# Patient Record
Sex: MALE | Race: WHITE | NOT HISPANIC OR LATINO | Employment: OTHER | URBAN - NONMETROPOLITAN AREA
[De-identification: names, ages, dates, MRNs, and addresses within clinical notes are randomized per-mention and may not be internally consistent; named-entity substitution may affect disease eponyms.]

---

## 2019-02-02 ENCOUNTER — HOSPITAL ENCOUNTER (INPATIENT)
Facility: HOSPITAL | Age: 79
LOS: 1 days | Discharge: HOME | End: 2019-02-04
Attending: EMERGENCY MEDICINE | Admitting: INTERNAL MEDICINE

## 2019-02-02 ENCOUNTER — APPOINTMENT (OUTPATIENT)
Dept: GENERAL RADIOLOGY | Facility: HOSPITAL | Age: 79
End: 2019-02-02

## 2019-02-02 DIAGNOSIS — I48.91 NEW ONSET ATRIAL FIBRILLATION (HCC): Primary | ICD-10-CM

## 2019-02-02 LAB
ALBUMIN SERPL-MCNC: 4.3 G/DL (ref 3.4–4.8)
ALBUMIN/GLOB SERPL: 1.7 G/DL (ref 1.5–2.5)
ALP SERPL-CCNC: 111 U/L (ref 40–129)
ALT SERPL W P-5'-P-CCNC: 26 U/L (ref 10–44)
ANION GAP SERPL CALCULATED.3IONS-SCNC: 6.5 MMOL/L (ref 3.6–11.2)
APTT PPP: 36.2 SECONDS (ref 23.8–36.1)
AST SERPL-CCNC: 23 U/L (ref 10–34)
BASOPHILS # BLD AUTO: 0.02 10*3/MM3 (ref 0–0.3)
BASOPHILS NFR BLD AUTO: 0.2 % (ref 0–2)
BILIRUB SERPL-MCNC: 1.1 MG/DL (ref 0.2–1.8)
BILIRUB UR QL STRIP: NEGATIVE
BUN BLD-MCNC: 21 MG/DL (ref 7–21)
BUN/CREAT SERPL: 18.3 (ref 7–25)
CALCIUM SPEC-SCNC: 9.1 MG/DL (ref 7.7–10)
CHLORIDE SERPL-SCNC: 108 MMOL/L (ref 99–112)
CLARITY UR: ABNORMAL
CO2 SERPL-SCNC: 25.5 MMOL/L (ref 24.3–31.9)
COLOR UR: ABNORMAL
CREAT BLD-MCNC: 1.15 MG/DL (ref 0.43–1.29)
D DIMER PPP FEU-MCNC: 0.38 MCGFEU/ML (ref 0–0.5)
DEPRECATED RDW RBC AUTO: 45 FL (ref 37–54)
EOSINOPHIL # BLD AUTO: 0.12 10*3/MM3 (ref 0–0.7)
EOSINOPHIL NFR BLD AUTO: 1.2 % (ref 0–7)
ERYTHROCYTE [DISTWIDTH] IN BLOOD BY AUTOMATED COUNT: 13.7 % (ref 11.5–14.5)
GFR SERPL CREATININE-BSD FRML MDRD: 62 ML/MIN/1.73
GLOBULIN UR ELPH-MCNC: 2.5 GM/DL
GLUCOSE BLD-MCNC: 124 MG/DL (ref 70–110)
GLUCOSE UR STRIP-MCNC: NEGATIVE MG/DL
HCT VFR BLD AUTO: 43.2 % (ref 42–52)
HGB BLD-MCNC: 14.6 G/DL (ref 14–18)
HGB UR QL STRIP.AUTO: NEGATIVE
HOLD SPECIMEN: NORMAL
HOLD SPECIMEN: NORMAL
IMM GRANULOCYTES # BLD AUTO: 0.02 10*3/MM3 (ref 0–0.03)
IMM GRANULOCYTES NFR BLD AUTO: 0.2 % (ref 0–0.5)
INR PPP: 1.05 (ref 0.9–1.1)
KETONES UR QL STRIP: ABNORMAL
LEUKOCYTE ESTERASE UR QL STRIP.AUTO: NEGATIVE
LYMPHOCYTES # BLD AUTO: 2.37 10*3/MM3 (ref 1–3)
LYMPHOCYTES NFR BLD AUTO: 23.7 % (ref 16–46)
MAGNESIUM SERPL-MCNC: 2 MG/DL (ref 1.7–2.6)
MCH RBC QN AUTO: 30.8 PG (ref 27–33)
MCHC RBC AUTO-ENTMCNC: 33.8 G/DL (ref 33–37)
MCV RBC AUTO: 91.1 FL (ref 80–94)
MONOCYTES # BLD AUTO: 1.48 10*3/MM3 (ref 0.1–0.9)
MONOCYTES NFR BLD AUTO: 14.8 % (ref 0–12)
NEUTROPHILS # BLD AUTO: 5.97 10*3/MM3 (ref 1.4–6.5)
NEUTROPHILS NFR BLD AUTO: 59.9 % (ref 40–75)
NITRITE UR QL STRIP: NEGATIVE
OSMOLALITY SERPL CALC.SUM OF ELEC: 283.8 MOSM/KG (ref 273–305)
PH UR STRIP.AUTO: <=5 [PH] (ref 5–8)
PLATELET # BLD AUTO: 207 10*3/MM3 (ref 130–400)
PMV BLD AUTO: 11.1 FL (ref 6–10)
POTASSIUM BLD-SCNC: 4 MMOL/L (ref 3.5–5.3)
PROT SERPL-MCNC: 6.8 G/DL (ref 6–8)
PROT UR QL STRIP: NEGATIVE
PROTHROMBIN TIME: 14 SECONDS (ref 11–15.4)
RBC # BLD AUTO: 4.74 10*6/MM3 (ref 4.7–6.1)
SODIUM BLD-SCNC: 140 MMOL/L (ref 135–153)
SP GR UR STRIP: 1.03 (ref 1–1.03)
TROPONIN I SERPL-MCNC: <0.006 NG/ML
UROBILINOGEN UR QL STRIP: ABNORMAL
WBC NRBC COR # BLD: 9.98 10*3/MM3 (ref 4.5–12.5)
WHOLE BLOOD HOLD SPECIMEN: NORMAL
WHOLE BLOOD HOLD SPECIMEN: NORMAL

## 2019-02-02 PROCEDURE — 80053 COMPREHEN METABOLIC PANEL: CPT | Performed by: EMERGENCY MEDICINE

## 2019-02-02 PROCEDURE — 84484 ASSAY OF TROPONIN QUANT: CPT | Performed by: EMERGENCY MEDICINE

## 2019-02-02 PROCEDURE — 85025 COMPLETE CBC W/AUTO DIFF WBC: CPT | Performed by: EMERGENCY MEDICINE

## 2019-02-02 PROCEDURE — 83735 ASSAY OF MAGNESIUM: CPT | Performed by: EMERGENCY MEDICINE

## 2019-02-02 PROCEDURE — 85730 THROMBOPLASTIN TIME PARTIAL: CPT | Performed by: EMERGENCY MEDICINE

## 2019-02-02 PROCEDURE — 85379 FIBRIN DEGRADATION QUANT: CPT | Performed by: EMERGENCY MEDICINE

## 2019-02-02 PROCEDURE — 71046 X-RAY EXAM CHEST 2 VIEWS: CPT

## 2019-02-02 PROCEDURE — 71046 X-RAY EXAM CHEST 2 VIEWS: CPT | Performed by: RADIOLOGY

## 2019-02-02 PROCEDURE — 99285 EMERGENCY DEPT VISIT HI MDM: CPT

## 2019-02-02 PROCEDURE — 93005 ELECTROCARDIOGRAM TRACING: CPT | Performed by: EMERGENCY MEDICINE

## 2019-02-02 PROCEDURE — 81003 URINALYSIS AUTO W/O SCOPE: CPT | Performed by: EMERGENCY MEDICINE

## 2019-02-02 PROCEDURE — 85610 PROTHROMBIN TIME: CPT | Performed by: EMERGENCY MEDICINE

## 2019-02-02 PROCEDURE — 93010 ELECTROCARDIOGRAM REPORT: CPT | Performed by: INTERNAL MEDICINE

## 2019-02-02 RX ORDER — ASPIRIN 81 MG/1
324 TABLET, CHEWABLE ORAL ONCE
Status: COMPLETED | OUTPATIENT
Start: 2019-02-02 | End: 2019-02-02

## 2019-02-02 RX ADMIN — ASPIRIN 324 MG: 81 TABLET, CHEWABLE ORAL at 22:34

## 2019-02-03 ENCOUNTER — APPOINTMENT (OUTPATIENT)
Dept: CARDIOLOGY | Facility: HOSPITAL | Age: 79
End: 2019-02-03
Attending: INTERNAL MEDICINE

## 2019-02-03 PROBLEM — I48.91 NEW ONSET ATRIAL FIBRILLATION: Status: ACTIVE | Noted: 2019-02-03

## 2019-02-03 LAB
ALBUMIN SERPL-MCNC: 4.1 G/DL (ref 3.4–4.8)
ALBUMIN/GLOB SERPL: 1.8 G/DL (ref 1.5–2.5)
ALP SERPL-CCNC: 100 U/L (ref 40–129)
ALT SERPL W P-5'-P-CCNC: 16 U/L (ref 10–44)
ANION GAP SERPL CALCULATED.3IONS-SCNC: 5.9 MMOL/L (ref 3.6–11.2)
AST SERPL-CCNC: 15 U/L (ref 10–34)
BASOPHILS # BLD AUTO: 0.01 10*3/MM3 (ref 0–0.3)
BASOPHILS NFR BLD AUTO: 0.1 % (ref 0–2)
BH CV ECHO MEAS - % IVS THICK: -13.4 %
BH CV ECHO MEAS - % LVPW THICK: 22.3 %
BH CV ECHO MEAS - ACS: 2.1 CM
BH CV ECHO MEAS - AO MAX PG: 10.4 MMHG
BH CV ECHO MEAS - AO MEAN PG: 5 MMHG
BH CV ECHO MEAS - AO ROOT AREA (BSA CORRECTED): 1.5
BH CV ECHO MEAS - AO ROOT AREA: 7.9 CM^2
BH CV ECHO MEAS - AO ROOT DIAM: 3.2 CM
BH CV ECHO MEAS - AO V2 MAX: 161.5 CM/SEC
BH CV ECHO MEAS - AO V2 MEAN: 102.2 CM/SEC
BH CV ECHO MEAS - AO V2 VTI: 33.5 CM
BH CV ECHO MEAS - BSA(HAYCOCK): 2.2 M^2
BH CV ECHO MEAS - BSA: 2.2 M^2
BH CV ECHO MEAS - BZI_BMI: 29.6 KILOGRAMS/M^2
BH CV ECHO MEAS - BZI_METRIC_HEIGHT: 180.3 CM
BH CV ECHO MEAS - BZI_METRIC_WEIGHT: 96.2 KG
BH CV ECHO MEAS - EDV(CUBED): 108.6 ML
BH CV ECHO MEAS - EDV(MOD-SP4): 63 ML
BH CV ECHO MEAS - EDV(TEICH): 106 ML
BH CV ECHO MEAS - EF(CUBED): 61.1 %
BH CV ECHO MEAS - EF(MOD-SP4): 42.9 %
BH CV ECHO MEAS - EF(TEICH): 52.6 %
BH CV ECHO MEAS - ESV(CUBED): 42.2 ML
BH CV ECHO MEAS - ESV(MOD-SP4): 36 ML
BH CV ECHO MEAS - ESV(TEICH): 50.3 ML
BH CV ECHO MEAS - FS: 27 %
BH CV ECHO MEAS - IVS/LVPW: 1.2
BH CV ECHO MEAS - IVSD: 1.2 CM
BH CV ECHO MEAS - IVSS: 1 CM
BH CV ECHO MEAS - LA DIMENSION: 3.7 CM
BH CV ECHO MEAS - LA/AO: 1.2
BH CV ECHO MEAS - LV DIASTOLIC VOL/BSA (35-75): 29.1 ML/M^2
BH CV ECHO MEAS - LV MASS(C)D: 189.1 GRAMS
BH CV ECHO MEAS - LV MASS(C)DI: 87.5 GRAMS/M^2
BH CV ECHO MEAS - LV MASS(C)S: 121.1 GRAMS
BH CV ECHO MEAS - LV MASS(C)SI: 56 GRAMS/M^2
BH CV ECHO MEAS - LV SYSTOLIC VOL/BSA (12-30): 16.7 ML/M^2
BH CV ECHO MEAS - LVIDD: 4.8 CM
BH CV ECHO MEAS - LVIDS: 3.5 CM
BH CV ECHO MEAS - LVLD AP4: 6 CM
BH CV ECHO MEAS - LVLS AP4: 5.6 CM
BH CV ECHO MEAS - LVOT AREA (M): 4.2 CM^2
BH CV ECHO MEAS - LVOT AREA: 4.1 CM^2
BH CV ECHO MEAS - LVOT DIAM: 2.3 CM
BH CV ECHO MEAS - LVPWD: 0.99 CM
BH CV ECHO MEAS - LVPWS: 1.2 CM
BH CV ECHO MEAS - MV A MAX VEL: 30.1 CM/SEC
BH CV ECHO MEAS - MV E MAX VEL: 120.9 CM/SEC
BH CV ECHO MEAS - MV E/A: 4
BH CV ECHO MEAS - PA ACC SLOPE: 626.4 CM/SEC^2
BH CV ECHO MEAS - PA ACC TIME: 0.14 SEC
BH CV ECHO MEAS - PA PR(ACCEL): 15.6 MMHG
BH CV ECHO MEAS - RAP SYSTOLE: 10 MMHG
BH CV ECHO MEAS - RVDD: 1.5 CM
BH CV ECHO MEAS - RVSP: 40.1 MMHG
BH CV ECHO MEAS - SI(AO): 122.4 ML/M^2
BH CV ECHO MEAS - SI(CUBED): 30.7 ML/M^2
BH CV ECHO MEAS - SI(MOD-SP4): 12.5 ML/M^2
BH CV ECHO MEAS - SI(TEICH): 25.8 ML/M^2
BH CV ECHO MEAS - SV(AO): 264.5 ML
BH CV ECHO MEAS - SV(CUBED): 66.4 ML
BH CV ECHO MEAS - SV(MOD-SP4): 27 ML
BH CV ECHO MEAS - SV(TEICH): 55.8 ML
BH CV ECHO MEAS - TR MAX VEL: 274.2 CM/SEC
BILIRUB SERPL-MCNC: 0.9 MG/DL (ref 0.2–1.8)
BUN BLD-MCNC: 21 MG/DL (ref 7–21)
BUN/CREAT SERPL: 19.6 (ref 7–25)
CALCIUM SPEC-SCNC: 8.9 MG/DL (ref 7.7–10)
CHLORIDE SERPL-SCNC: 110 MMOL/L (ref 99–112)
CHOLEST SERPL-MCNC: 139 MG/DL (ref 0–200)
CK MB SERPL-CCNC: 0.73 NG/ML (ref 0–5)
CK MB SERPL-CCNC: 0.77 NG/ML (ref 0–5)
CK MB SERPL-CCNC: 0.79 NG/ML (ref 0–5)
CK MB SERPL-RTO: 3.4 % (ref 0–3)
CK MB SERPL-RTO: 4.6 % (ref 0–3)
CK MB SERPL-RTO: 4.8 % (ref 0–3)
CK SERPL-CCNC: 16 U/L (ref 24–204)
CK SERPL-CCNC: 16 U/L (ref 24–204)
CK SERPL-CCNC: 23 U/L (ref 24–204)
CO2 SERPL-SCNC: 24.1 MMOL/L (ref 24.3–31.9)
CREAT BLD-MCNC: 1.07 MG/DL (ref 0.43–1.29)
DEPRECATED RDW RBC AUTO: 44.2 FL (ref 37–54)
EOSINOPHIL # BLD AUTO: 0.17 10*3/MM3 (ref 0–0.7)
EOSINOPHIL NFR BLD AUTO: 1.8 % (ref 0–7)
ERYTHROCYTE [DISTWIDTH] IN BLOOD BY AUTOMATED COUNT: 13.4 % (ref 11.5–14.5)
GFR SERPL CREATININE-BSD FRML MDRD: 67 ML/MIN/1.73
GLOBULIN UR ELPH-MCNC: 2.3 GM/DL
GLUCOSE BLD-MCNC: 126 MG/DL (ref 70–110)
HCT VFR BLD AUTO: 41.6 % (ref 42–52)
HDLC SERPL-MCNC: 31 MG/DL (ref 60–100)
HGB BLD-MCNC: 14 G/DL (ref 14–18)
IMM GRANULOCYTES # BLD AUTO: 0.03 10*3/MM3 (ref 0–0.03)
IMM GRANULOCYTES NFR BLD AUTO: 0.3 % (ref 0–0.5)
LDLC SERPL CALC-MCNC: 91 MG/DL (ref 0–100)
LDLC/HDLC SERPL: 2.94 {RATIO}
LYMPHOCYTES # BLD AUTO: 2.11 10*3/MM3 (ref 1–3)
LYMPHOCYTES NFR BLD AUTO: 22.8 % (ref 16–46)
MAXIMAL PREDICTED HEART RATE: 143 BPM
MCH RBC QN AUTO: 30.7 PG (ref 27–33)
MCHC RBC AUTO-ENTMCNC: 33.7 G/DL (ref 33–37)
MCV RBC AUTO: 91.2 FL (ref 80–94)
MONOCYTES # BLD AUTO: 1.31 10*3/MM3 (ref 0.1–0.9)
MONOCYTES NFR BLD AUTO: 14.2 % (ref 0–12)
MYOGLOBIN SERPL-MCNC: 43 NG/ML (ref 0–109)
MYOGLOBIN SERPL-MCNC: 43 NG/ML (ref 0–109)
MYOGLOBIN SERPL-MCNC: 44 NG/ML (ref 0–109)
NEUTROPHILS # BLD AUTO: 5.61 10*3/MM3 (ref 1.4–6.5)
NEUTROPHILS NFR BLD AUTO: 60.8 % (ref 40–75)
OSMOLALITY SERPL CALC.SUM OF ELEC: 283.9 MOSM/KG (ref 273–305)
PLATELET # BLD AUTO: 175 10*3/MM3 (ref 130–400)
PMV BLD AUTO: 11 FL (ref 6–10)
POTASSIUM BLD-SCNC: 3.7 MMOL/L (ref 3.5–5.3)
PROT SERPL-MCNC: 6.4 G/DL (ref 6–8)
RBC # BLD AUTO: 4.56 10*6/MM3 (ref 4.7–6.1)
SODIUM BLD-SCNC: 140 MMOL/L (ref 135–153)
STRESS TARGET HR: 122 BPM
TRIGL SERPL-MCNC: 85 MG/DL (ref 0–150)
TROPONIN I SERPL-MCNC: <0.006 NG/ML
TSH SERPL DL<=0.05 MIU/L-ACNC: 4.79 MIU/ML (ref 0.55–4.78)
VLDLC SERPL-MCNC: 17 MG/DL
WBC NRBC COR # BLD: 9.24 10*3/MM3 (ref 4.5–12.5)

## 2019-02-03 PROCEDURE — 84484 ASSAY OF TROPONIN QUANT: CPT | Performed by: INTERNAL MEDICINE

## 2019-02-03 PROCEDURE — 94799 UNLISTED PULMONARY SVC/PX: CPT

## 2019-02-03 PROCEDURE — 84484 ASSAY OF TROPONIN QUANT: CPT | Performed by: EMERGENCY MEDICINE

## 2019-02-03 PROCEDURE — 83874 ASSAY OF MYOGLOBIN: CPT | Performed by: INTERNAL MEDICINE

## 2019-02-03 PROCEDURE — 80061 LIPID PANEL: CPT | Performed by: INTERNAL MEDICINE

## 2019-02-03 PROCEDURE — 99222 1ST HOSP IP/OBS MODERATE 55: CPT | Performed by: INTERNAL MEDICINE

## 2019-02-03 PROCEDURE — 99220 PR INITIAL OBSERVATION CARE/DAY 70 MINUTES: CPT | Performed by: INTERNAL MEDICINE

## 2019-02-03 PROCEDURE — 85025 COMPLETE CBC W/AUTO DIFF WBC: CPT | Performed by: INTERNAL MEDICINE

## 2019-02-03 PROCEDURE — 84443 ASSAY THYROID STIM HORMONE: CPT | Performed by: INTERNAL MEDICINE

## 2019-02-03 PROCEDURE — 82553 CREATINE MB FRACTION: CPT | Performed by: INTERNAL MEDICINE

## 2019-02-03 PROCEDURE — 82550 ASSAY OF CK (CPK): CPT | Performed by: INTERNAL MEDICINE

## 2019-02-03 PROCEDURE — 93306 TTE W/DOPPLER COMPLETE: CPT

## 2019-02-03 PROCEDURE — 25010000002 ENOXAPARIN PER 10 MG: Performed by: EMERGENCY MEDICINE

## 2019-02-03 PROCEDURE — 36415 COLL VENOUS BLD VENIPUNCTURE: CPT

## 2019-02-03 PROCEDURE — 80053 COMPREHEN METABOLIC PANEL: CPT | Performed by: INTERNAL MEDICINE

## 2019-02-03 PROCEDURE — 25010000002 ENOXAPARIN PER 10 MG: Performed by: INTERNAL MEDICINE

## 2019-02-03 RX ORDER — MULTIVITAMIN
1 TABLET ORAL DAILY
Status: DISCONTINUED | OUTPATIENT
Start: 2019-02-03 | End: 2019-02-04 | Stop reason: HOSPADM

## 2019-02-03 RX ORDER — SODIUM CHLORIDE 0.9 % (FLUSH) 0.9 %
3-10 SYRINGE (ML) INJECTION AS NEEDED
Status: DISCONTINUED | OUTPATIENT
Start: 2019-02-03 | End: 2019-02-04 | Stop reason: HOSPADM

## 2019-02-03 RX ORDER — MULTIVITAMIN
1 TABLET ORAL DAILY
COMMUNITY

## 2019-02-03 RX ORDER — SPIRONOLACTONE 25 MG/1
25 TABLET ORAL DAILY
Status: CANCELLED | OUTPATIENT
Start: 2019-02-03

## 2019-02-03 RX ORDER — ATORVASTATIN CALCIUM 20 MG/1
20 TABLET, FILM COATED ORAL DAILY
Status: DISCONTINUED | OUTPATIENT
Start: 2019-02-03 | End: 2019-02-04 | Stop reason: HOSPADM

## 2019-02-03 RX ORDER — ACETAMINOPHEN 325 MG/1
650 TABLET ORAL EVERY 6 HOURS PRN
Status: DISCONTINUED | OUTPATIENT
Start: 2019-02-03 | End: 2019-02-04 | Stop reason: HOSPADM

## 2019-02-03 RX ORDER — CALCIUM CARBONATE 200(500)MG
1 TABLET,CHEWABLE ORAL 3 TIMES DAILY PRN
Status: DISCONTINUED | OUTPATIENT
Start: 2019-02-03 | End: 2019-02-04 | Stop reason: HOSPADM

## 2019-02-03 RX ORDER — OMEPRAZOLE 20 MG/1
20 CAPSULE, DELAYED RELEASE ORAL DAILY
COMMUNITY
End: 2020-06-05 | Stop reason: SDUPTHER

## 2019-02-03 RX ORDER — AMLODIPINE AND VALSARTAN 5; 160 MG/1; MG/1
1 TABLET ORAL DAILY
COMMUNITY
End: 2019-02-04 | Stop reason: HOSPADM

## 2019-02-03 RX ORDER — ASPIRIN 81 MG/1
81 TABLET ORAL DAILY
Status: DISCONTINUED | OUTPATIENT
Start: 2019-02-03 | End: 2019-02-04 | Stop reason: HOSPADM

## 2019-02-03 RX ORDER — SODIUM CHLORIDE 0.9 % (FLUSH) 0.9 %
3 SYRINGE (ML) INJECTION EVERY 12 HOURS SCHEDULED
Status: DISCONTINUED | OUTPATIENT
Start: 2019-02-03 | End: 2019-02-04 | Stop reason: HOSPADM

## 2019-02-03 RX ORDER — FLUTICASONE PROPIONATE 50 MCG
2 SPRAY, SUSPENSION (ML) NASAL DAILY
Status: DISCONTINUED | OUTPATIENT
Start: 2019-02-03 | End: 2019-02-04 | Stop reason: HOSPADM

## 2019-02-03 RX ORDER — SPIRONOLACTONE 25 MG/1
25 TABLET ORAL DAILY
COMMUNITY
End: 2019-02-04 | Stop reason: HOSPADM

## 2019-02-03 RX ORDER — PANTOPRAZOLE SODIUM 40 MG/1
40 TABLET, DELAYED RELEASE ORAL EVERY MORNING
Status: CANCELLED | OUTPATIENT
Start: 2019-02-03

## 2019-02-03 RX ORDER — ASPIRIN 81 MG/1
81 TABLET ORAL DAILY
COMMUNITY
End: 2020-06-05

## 2019-02-03 RX ORDER — PANTOPRAZOLE SODIUM 40 MG/1
40 TABLET, DELAYED RELEASE ORAL
Status: DISCONTINUED | OUTPATIENT
Start: 2019-02-03 | End: 2019-02-04 | Stop reason: HOSPADM

## 2019-02-03 RX ADMIN — PANTOPRAZOLE SODIUM 40 MG: 40 TABLET, DELAYED RELEASE ORAL at 18:24

## 2019-02-03 RX ADMIN — ATORVASTATIN CALCIUM 20 MG: 20 TABLET, FILM COATED ORAL at 08:41

## 2019-02-03 RX ADMIN — ASPIRIN 81 MG: 81 TABLET ORAL at 08:41

## 2019-02-03 RX ADMIN — ACETAMINOPHEN 650 MG: 325 TABLET ORAL at 06:20

## 2019-02-03 RX ADMIN — ENOXAPARIN SODIUM 100 MG: 100 INJECTION SUBCUTANEOUS at 00:26

## 2019-02-03 RX ADMIN — Medication 1 TABLET: at 08:41

## 2019-02-03 RX ADMIN — ENOXAPARIN SODIUM 100 MG: 100 INJECTION SUBCUTANEOUS at 20:22

## 2019-02-03 RX ADMIN — METOPROLOL TARTRATE 12.5 MG: 25 TABLET, FILM COATED ORAL at 20:22

## 2019-02-03 RX ADMIN — SODIUM CHLORIDE, PRESERVATIVE FREE 3 ML: 5 INJECTION INTRAVENOUS at 08:42

## 2019-02-03 RX ADMIN — FLUTICASONE PROPIONATE 2 SPRAY: 50 SPRAY, METERED NASAL at 18:24

## 2019-02-03 RX ADMIN — METOPROLOL TARTRATE 12.5 MG: 25 TABLET, FILM COATED ORAL at 08:41

## 2019-02-03 NOTE — PLAN OF CARE
Problem: Patient Care Overview  Goal: Plan of Care Review  Outcome: Ongoing (interventions implemented as appropriate)    Goal: Individualization and Mutuality  Outcome: Ongoing (interventions implemented as appropriate)    Goal: Discharge Needs Assessment  Outcome: Ongoing (interventions implemented as appropriate)    Goal: Interprofessional Rounds/Family Conf  Outcome: Ongoing (interventions implemented as appropriate)      Problem: Fall Risk (Adult)  Goal: Identify Related Risk Factors and Signs and Symptoms  Outcome: Ongoing (interventions implemented as appropriate)    Goal: Absence of Fall  Outcome: Ongoing (interventions implemented as appropriate)      Problem: Arrhythmia/Dysrhythmia (Symptomatic) (Adult)  Goal: Signs and Symptoms of Listed Potential Problems Will be Absent, Minimized or Managed (Arrhythmia/Dysrhythmia)  Outcome: Ongoing (interventions implemented as appropriate)

## 2019-02-03 NOTE — ED NOTES
0246 PT IS AAO X4 PT HAS NO SIGNS OF DISTRESS BREATHING IS EQUAL AND  UNLABORED SKIN PWD     Chary Mckeon, RN  02/03/19 0641

## 2019-02-03 NOTE — PROGRESS NOTES
Pt seen and examined with TATE Degroot. I have reviewed Dr. Moreno's H&P and discussed pt's care with her personally. Pt admitted overnight with new onset Afib and chest pain. Pt reports he has been having some indigestion. Family at bedside reports chest tightness. Serial troponin's are negative. Echo pending. Electrolytes are stable. TSH mildly elevated, will order free T4. Currently rate controlled. Cardiology has ordered nuclear stress test for the AM and asked pharmacy to evaluate the cost of the novel anticoagulants. Will cont therapeutic Lovenox for now. Monitor on telemetry. Cardiology input appreciated.       Osbaldo Woods,   02/03/19  6:03 PM

## 2019-02-03 NOTE — ED PROVIDER NOTES
Subjective   Pt sent from urgent care for new onset A-fib.  Pt went to Urgent Care for chest pain/indigestion.  UC did an EKG and found A-Fib.  Pt does not have any history of A-fib or CAD.  Pt describes upper sub-sternal pressure.  Pt has had similar discomfort for a long time, worse for the past 3-4 days    Family has EKG from US showing A-Fib        History provided by:  Patient, spouse and relative  Chest Pain   Pain location:  Substernal area  Pain quality: pressure    Pain radiates to:  Does not radiate  Pain severity:  Moderate  Onset quality:  Unable to specify  Timing:  Unable to specify  Progression:  Unchanged  Chronicity:  Recurrent  Context: not breathing, not drug use, not eating, not intercourse, not lifting, not movement, not raising an arm, not at rest, not stress and not trauma    Relieved by:  Nothing  Worsened by:  Nothing  Ineffective treatments:  None tried  Associated symptoms: heartburn and nausea    Associated symptoms: no abdominal pain, no AICD problem, no altered mental status, no anorexia, no anxiety, no back pain, no claudication, no cough, no diaphoresis, no dizziness, no dysphagia, no fatigue, no fever, no headache, no lower extremity edema, no near-syncope, no numbness, no orthopnea, no palpitations, no PND, no shortness of breath, no syncope, no vomiting and no weakness    Risk factors: hypertension    Risk factors: no aortic disease, no birth control, no coronary artery disease, no diabetes mellitus, no Og-Danlos syndrome, no high cholesterol, no immobilization, not male, no Marfan's syndrome, not obese, not pregnant, no prior DVT/PE, no smoking and no surgery        Review of Systems   Constitutional: Negative.  Negative for diaphoresis, fatigue and fever.   HENT: Positive for congestion and postnasal drip. Negative for nosebleeds, trouble swallowing and voice change.    Eyes: Negative.    Respiratory: Positive for chest tightness. Negative for cough and shortness of breath.     Cardiovascular: Positive for chest pain. Negative for palpitations, orthopnea, claudication, syncope, PND and near-syncope.   Gastrointestinal: Positive for heartburn and nausea. Negative for abdominal pain, anorexia and vomiting.   Endocrine: Negative.    Genitourinary: Negative.    Musculoskeletal: Negative.  Negative for back pain.   Skin: Negative.    Allergic/Immunologic: Negative.    Neurological: Negative.  Negative for dizziness, weakness, numbness and headaches.   Hematological: Negative.    Psychiatric/Behavioral: Negative.    All other systems reviewed and are negative.      Past Medical History:   Diagnosis Date   • Hypertension        No Known Allergies    No past surgical history on file.    No family history on file.    Social History     Socioeconomic History   • Marital status:      Spouse name: Not on file   • Number of children: Not on file   • Years of education: Not on file   • Highest education level: Not on file   Tobacco Use   • Smoking status: Never Smoker   • Smokeless tobacco: Never Used   Substance and Sexual Activity   • Alcohol use: No     Frequency: Never   • Drug use: No   • Sexual activity: Defer           Objective   Physical Exam   Constitutional: He is oriented to person, place, and time. He appears well-developed and well-nourished.   HENT:   Head: Normocephalic and atraumatic.   Eyes: EOM are normal.   Neck: Normal range of motion. Neck supple. No tracheal deviation present.   Cardiovascular: An irregularly irregular rhythm present. Tachycardia present. Exam reveals no S4.   No murmur heard.   No systolic murmur is present.   No diastolic murmur is present.  Pulses:       Radial pulses are 1+ on the right side, and 1+ on the left side.   Pulmonary/Chest: Effort normal and breath sounds normal. No accessory muscle usage or stridor. No tachypnea. No respiratory distress. He has no decreased breath sounds. He has no wheezes. He has no rhonchi. He has no rales.   Abdominal:  Soft. Bowel sounds are normal. He exhibits distension. There is no tenderness. There is no guarding.   Musculoskeletal: Normal range of motion.        Right lower leg: Normal. He exhibits no edema.        Left lower leg: Normal. He exhibits no edema.   Lymphadenopathy:     He has no cervical adenopathy.   Neurological: He is alert and oriented to person, place, and time. He is not disoriented. No cranial nerve deficit.   Skin: Skin is warm and dry. Capillary refill takes less than 2 seconds. No rash noted. No cyanosis. No pallor.   Psychiatric: He has a normal mood and affect. His behavior is normal. His mood appears not anxious. He is not agitated.   Nursing note and vitals reviewed.      Procedures           ED Course  ED Course as of Feb 03 0735   Sun Feb 03, 2019   0206 Patient is hemodynamically stable.  He remains in rate controlled atrial fibrillation with a heart rate of 85-92.  Oxygen saturation 94% on room air.  Respiratory rate 20.  Blood pressure 112/71.  Negative serial cardiac enzymes.  [TZ]   0222 D/W Dr Moreno accepting for med/surg with tele  [TZ]   0256 12-lead EKG performed at 2036 hrs.  Atrial fibrillation.  Ventricular rate 98.  QRS duration 104.  .  QTc 421.  No pathologic blocks.  Patient is in sustained, rate controlled atrial fibrillation.  No acute ischemic ST segment elevation.  No overt criteria for STEMI/infarct. ECG 12 Lead [TZ]      ED Course User Index  [TZ] Dustin Pimentel MD      XR Chest 2 View    (Results Pending)     Labs Reviewed   COMPREHENSIVE METABOLIC PANEL - Abnormal; Notable for the following components:       Result Value    Glucose 124 (*)     All other components within normal limits    Narrative:     The MDRD GFR formula is only valid for adults with stable renal function between ages 18 and 70.   CBC WITH AUTO DIFFERENTIAL - Abnormal; Notable for the following components:    MPV 11.1 (*)     Monocyte % 14.8 (*)     Monocytes, Absolute 1.48 (*)     All  other components within normal limits   APTT - Abnormal; Notable for the following components:    PTT 36.2 (*)     All other components within normal limits    Narrative:     PTT Heparin Therapeutic Range:  59 - 95 seconds   URINALYSIS W/ MICROSCOPIC IF INDICATED (NO CULTURE) - Abnormal; Notable for the following components:    Color, UA Dark Yellow (*)     Appearance, UA Cloudy (*)     Ketones, UA Trace (*)     All other components within normal limits    Narrative:     Urine microscopic not indicated.   COMPREHENSIVE METABOLIC PANEL - Abnormal; Notable for the following components:    Glucose 126 (*)     CO2 24.1 (*)     All other components within normal limits    Narrative:     The MDRD GFR formula is only valid for adults with stable renal function between ages 18 and 70.   CBC WITH AUTO DIFFERENTIAL - Abnormal; Notable for the following components:    RBC 4.56 (*)     Hematocrit 41.6 (*)     MPV 11.0 (*)     Monocyte % 14.2 (*)     Monocytes, Absolute 1.31 (*)     All other components within normal limits   CK - Abnormal; Notable for the following components:    Creatine Kinase 23 (*)     All other components within normal limits   TSH - Abnormal; Notable for the following components:    TSH 4.792 (*)     All other components within normal limits   LIPID PANEL - Abnormal; Notable for the following components:    HDL Cholesterol 31 (*)     All other components within normal limits    Narrative:     Cholesterol Reference Ranges  (U.S. Department of Health and Human Services ATP III Classifications)    Desirable          <200 mg/dL  Borderline High    200-239 mg/dL  High Risk          >240 mg/dL      Triglyceride Reference Ranges  (U.S. Department of Health and Human Services ATP III Classifications)    Normal           <150 mg/dL  Borderline High  150-199 mg/dL  High             200-499 mg/dL  Very High        >500 mg/dL    HDL Reference Ranges  (U.S. Department of Health and Human Services ATP III  Classifcations)    Low     <40 mg/dl (major risk factor for CHD)  High    >60 mg/dl ('negative' risk factor for CHD)        LDL Reference Ranges  (U.S. Department of Health and Human Services ATP III Classifcations)    Optimal          <100 mg/dL  Near Optimal     100-129 mg/dL  Borderline High  130-159 mg/dL  High             160-189 mg/dL  Very High        >189 mg/dL   CKMB INDEX CALCULATION - Abnormal; Notable for the following components:    CK-MB Index 3.4 (*)     All other components within normal limits   TROPONIN (IN-HOUSE) - Normal    Narrative:     Ultra Troponin I Reference Range:         <=0.039 ng/mL: Negative    0.04-0.779 ng/mL: Indeterminate Range. Suspicious of MI.  Clinical correlation required.       >=0.78  ng/mL: Consistent with myocardial injury.  Clinical correlation required.   PROTIME-INR - Normal    Narrative:     Suggested INR therapeutic range for stable oral anticoagulant therapy:    Low Intensity therapy:   1.5-2.0  Moderate Intensity therapy:   2.0-3.0  High Intensity therapy:   2.5-4.0   D-DIMER, QUANTITATIVE - Normal    Narrative:     d-Dimer assay result is to be used in conjunction with a non-high clinical pretest probability (PTP) assessment tool to exclude PE and aid in diagnosis of VTE with cutoff of 0.5 MCGFEU/mL.   MAGNESIUM - Normal   OSMOLALITY, CALCULATED - Normal   TROPONIN (IN-HOUSE) - Normal    Narrative:     Ultra Troponin I Reference Range:         <=0.039 ng/mL: Negative    0.04-0.779 ng/mL: Indeterminate Range. Suspicious of MI.  Clinical correlation required.       >=0.78  ng/mL: Consistent with myocardial injury.  Clinical correlation required.   CK MB - Normal   MYOGLOBIN, SERUM - Normal   TROPONIN (IN-HOUSE) - Normal    Narrative:     Ultra Troponin I Reference Range:         <=0.039 ng/mL: Negative    0.04-0.779 ng/mL: Indeterminate Range. Suspicious of MI.  Clinical correlation required.       >=0.78  ng/mL: Consistent with myocardial injury.  Clinical  correlation required.   OSMOLALITY, CALCULATED - Normal   RAINBOW DRAW    Narrative:     The following orders were created for panel order [place] Draw.  Procedure                               Abnormality         Status                     ---------                               -----------         ------                     Light Blue Top[[id number]]                                   Final result               Green Top (Gel)[[id number]]                                  Final result               Lavender Top[[id number]]                                     Final result               Gold Top - SST[[id number]]                                   Final result                 Please view results for these tests on the individual orders.   CK   CK MB   MYOGLOBIN, SERUM   TROPONIN (IN-HOUSE)   CBC AND DIFFERENTIAL    Narrative:     The following orders were created for panel order CBC & Differential.  Procedure                               Abnormality         Status                     ---------                               -----------         ------                     CBC Auto Differential[[id number]]        Abnormal            Final result                 Please view results for these tests on the individual orders.   LIGHT BLUE TOP   GREEN TOP   LAVENDER TOP   GOLD TOP - SST   CBC AND DIFFERENTIAL    Narrative:     The following orders were created for panel order CBC & Differential.  Procedure                               Abnormality         Status                     ---------                               -----------         ------                     CBC Auto Differential[[id number]]        Abnormal            Final result                 Please view results for these tests on the individual orders.        Medication List      ASK your doctor about these medications    amLODIPine-valsartan 5-160 MG per tablet  Commonly known as:  EXFORGE     aspirin 81 MG EC tablet     multivitamin tablet tablet                     MDM  Number of Diagnoses or Management Options  New onset atrial fibrillation (CMS/HCC): new and requires workup     Amount and/or Complexity of Data Reviewed  Clinical lab tests: ordered and reviewed  Tests in the radiology section of CPT®: ordered and reviewed  Obtain history from someone other than the patient: yes  Discuss the patient with other providers: yes    Risk of Complications, Morbidity, and/or Mortality  Presenting problems: high  Diagnostic procedures: high  Management options: high    Patient Progress  Patient progress: stable (Fair  )        Final diagnoses:   New onset atrial fibrillation (CMS/HCC)            Dustin Pimentel MD  02/03/19 0735

## 2019-02-03 NOTE — PHARMACY PATIENT ASSISTANCE
Pharmacy was consulted to evaluate patient co-pay for Eliquis. According to the patients insurance he would have a $47 co-pay. Upon calling the patient to see if this was affordable for him, he reported that he wanted to speak with the doctor first about the medication. If it is decided that the patient will start on the medication a free trial card can be requested for discharge. Please notify pharmacy prior to discharge if a free trial card is needed or if this price is not affordable.       Thank you,  Kim Brown. Luan [facility]  02/03/19  2:49 PM

## 2019-02-03 NOTE — H&P
"Hospitalist History and Physical    Patient Identification:  Name: Bill Sanford  Age/Sex: 79 y.o. male  :  1940  MRN: [id number]         Primary Care Physician: Zane Lipscomb FNP    Chief Complaint   Patient presents with   • Chest Pain       History of Present Illness  Patient is a 79 y.o. male presents with the following: chest pain    The patient is a 80 yo male with PMHx significant for hypertension who presents to the emergency department with a four-day history of chest pain.    Please the patient is a fairly poor historian but states that he has had what he thought was \"heartburn\" in his chest for approximately 4 days.  His wife states that he has mentioned that there is a heaviness in his chest at times.  The patient denies any known inciting, exacerbating and/or alleviating factors.  The patient denies shortness of breath but the patient's wife states that at times he appears to be dyspneic.  He denies any palpitations.    The patient reports a long-standing history of chronic sinusitis.  He states that he has recently had a headache.  He denies fevers and/or chills.  No cough.  No vomiting, nausea, abdominal pain and/or diarrhea.    Patient denies previous known history of atrial fibrillation.  He denies any history of myocardial infarction.  No known history of diabetes mellitus and/or congestive heart failure.  He has never had a previous stroke.    The patient present to an urgent care clinic today. He was found to be in atrial fibrillation and encouraged to report to the ED.     The patient's maximum heart rate was documented in the ER was 103.  EKG revealed rate controlled atrial fibrillation. CMP was unremarkable except for a glucose of 124. He had two negative troponin levels. CBC was unremarkable. CXR was performed and per my view revealed chronic interstitial lung disease changes. No acute infiltrates and/or effusions per my view.     Present during visit: Patient's wife and Hiro, " RN    Past History:  Past Medical History:   Diagnosis Date   • Hypertension      No past surgical history on file.  No family history on file.  Social History     Tobacco Use   • Smoking status: Never Smoker   • Smokeless tobacco: Never Used   Substance Use Topics   • Alcohol use: No     Frequency: Never   • Drug use: No     Medications Prior to Admission   Medication Sig Dispense Refill Last Dose   • amLODIPine-valsartan (EXFORGE) 5-160 MG per tablet Take 1 tablet by mouth Daily.   2/2/2019 at am   • aspirin 81 MG EC tablet Take 81 mg by mouth Daily.   2/2/2019 at  am   • multivitamin (DAILY ONEYDA) tablet tablet Take 1 tablet by mouth Daily.   2/2/2019 at  am     Allergies: Patient has no known allergies.    Review of Systems:  Review of Systems   Constitutional: Negative for chills, diaphoresis and fever.   HENT: Negative for hearing loss, tinnitus and trouble swallowing.    Eyes: Negative for discharge and visual disturbance.   Respiratory: Negative for cough, shortness of breath and wheezing.    Cardiovascular: Positive for chest pain. Negative for palpitations and leg swelling.   Gastrointestinal: Negative for abdominal pain, constipation, diarrhea, nausea and vomiting.   Endocrine: Negative for polydipsia, polyphagia and polyuria.   Genitourinary: Negative for dysuria, frequency and hematuria.   Musculoskeletal: Negative for gait problem, myalgias and neck pain.   Skin: Negative for rash and wound.   Neurological: Negative for dizziness, tremors, seizures, syncope, weakness and light-headedness.   Hematological: Does not bruise/bleed easily.   Psychiatric/Behavioral: Negative for confusion, hallucinations and suicidal ideas.      Vital Signs  Temp:  [97.4 °F (36.3 °C)-98.6 °F (37 °C)] 97.4 °F (36.3 °C)  Heart Rate:  [] 97  Resp:  [18] 18  BP: ()/(54-78) 112/59  Body mass index is 29.62 kg/m².    Physical Exam:  Physical Exam   Constitutional: He is oriented to person, place, and time. He appears  well-developed and well-nourished. No distress.   HENT:   Head: Normocephalic and atraumatic.   Mouth/Throat: Oropharynx is clear and moist.   Eyes: Conjunctivae and EOM are normal. Pupils are equal, round, and reactive to light.   Neck: Neck supple. No tracheal deviation present. No thyromegaly present.   Cardiovascular: Normal rate. An irregularly irregular rhythm present. Exam reveals no gallop and no friction rub.   No murmur heard.  Pulmonary/Chest: Breath sounds normal. No respiratory distress. He has no wheezes. He has no rales.   Abdominal: Soft. Bowel sounds are normal. He exhibits no distension. There is no tenderness. There is no guarding.   Musculoskeletal: Normal range of motion. He exhibits no tenderness.   Neurological: He is alert and oriented to person, place, and time. No cranial nerve deficit.   Skin: Skin is warm and dry. No rash noted. No erythema.   Psychiatric: He has a normal mood and affect.      Results Review:    Results from last 7 days   Lab Units 02/02/19  2155   WBC 10*3/mm3 9.98   HEMOGLOBIN g/dL 14.6   HEMATOCRIT % 43.2   PLATELETS 10*3/mm3 207     Results from last 7 days   Lab Units 02/02/19  2155   SODIUM mmol/L 140   POTASSIUM mmol/L 4.0   CHLORIDE mmol/L 108   CO2 mmol/L 25.5   BUN mg/dL 21   CREATININE mg/dL 1.15   CALCIUM mg/dL 9.1   GLUCOSE mg/dL 124*     Results from last 7 days   Lab Units 02/02/19  2155   BILIRUBIN mg/dL 1.1   ALK PHOS U/L 111   AST (SGOT) U/L 23   ALT (SGPT) U/L 26         Results from last 7 days   Lab Units 02/02/19  2155   MAGNESIUM mg/dL 2.0     Results from last 7 days   Lab Units 02/03/19  0003 02/02/19  2155   TROPONIN I ng/mL <0.006 <0.006     Results from last 7 days   Lab Units 02/02/19  2155   INR  1.05       Imaging:    I have personally reviewed the EKG. Pending official cardiology interpretation, per my view: Rate controlled atrial fibrillation; no acute ST-T changes.     Imaging Results (most recent)     Procedure Component Value Units  Date/Time    XR Chest 2 View [[id number]] Updated:  02/02/19 2120        *Pending official radiology interpretation; per my view: chronic lung disease changes; no acute infiltrates and/or effusions.     Assessment/Plan     -Presumed new onset atrial fibrillation with [id number] score of approximately 3: Admit to telemetry.  Trend cardiac isoenzymes.  Obtain echocardiogram.  The patient received a full dose of Lovenox in the emergency department.  I will place him on a statin medication and low-dose metoprolol tartrate with holding parameters.  I will check a TSH and HgbA1c. Potassium and Magnesium level are within normal limits.  A cardiology consult has been placed for further recommendations.    -Essential hypertension that is currently controlled: I feel the patient's home medication and started metoprolol tartrate instead.    DVT prophylaxis: Full dose lovenox    Estimated Length of Stay: > 2 MNs    CODE: FULL/CPR    I discussed the patients findings and my recommendations with patient, family and nursing staff      Montserrat Moreno DO  02/03/19  3:51 AM

## 2019-02-03 NOTE — CONSULTS
Consults  Date of Admit: 2/2/2019  Date of Consult: 02/03/19  Dustin Pimentel, *  Bill Sanford  1940  Consulting Physician: Dr. Villasenor    Cardiology consultation    Reason for consultation: New onset atrial fibrillation   Assessment:  1. New onset atrial fibrillation [id number] score 3 for age and essential hypertension.  2. Essential hypertension, controlled  3. Chest pains, troponin negative x4      Recommendations:  1. Will check on the cost on newer anticoagulants such as Eliquis her Xarelto.  2. Transthoracic echocardiogram still pending.   3. Will evaulate patient's chest pains in the morning and will make patient NPO after midnight.     History of Present Illness    Subjective     Chief Complaint   Patient presents with   • Chest Pain       Bill Sanford is a 79 y.o. male with past medical history significant for no known history of ASCVD, diabetes mellitus, heart failure, or history of TIA/stroke. He does admit history of tobacco abuse but quit 15 years ago. He does have history of essential hypertension and is 79 years of age making his [id number] score 3.     Patient states that he came into the emergency room after going to an urgent care facility who found the patient atrial fibrillation for which she denies any history of this.  He states for the past week or so he has been getting chest pain that he describes as a burning/pressure that he assumed was indigestion. He denies any radiation of this pain and also denies associated symptoms of shortness of breath or diaphoresis. He states that this pain has been constant and has no relation to exertion.      Cardiac risk factors:hypertension and Obesity        Past Medical History:   Diagnosis Date   • Hypertension      No past surgical history on file.  No family history on file.  Social History     Tobacco Use   • Smoking status: Never Smoker   • Smokeless tobacco: Never Used   Substance Use Topics   • Alcohol use: No     Frequency: Never   •  Drug use: No     Medications Prior to Admission   Medication Sig Dispense Refill Last Dose   • amLODIPine-valsartan (EXFORGE) 5-160 MG per tablet Take 1 tablet by mouth Daily.   2/2/2019 at am   • aspirin 81 MG EC tablet Take 81 mg by mouth Daily.   2/2/2019 at  am   • multivitamin (DAILY ONEYDA) tablet tablet Take 1 tablet by mouth Daily.   2/2/2019 at  am   • omeprazole (priLOSEC) 20 MG capsule Take 20 mg by mouth Daily.   2/2/2019 at Unknown time   • spironolactone (ALDACTONE) 25 MG tablet Take 25 mg by mouth Daily.   2/2/2019 at Unknown time     Allergies:  Patient has no known allergies.      Current Facility-Administered Medications:   •  acetaminophen (TYLENOL) tablet 650 mg, 650 mg, Oral, Q6H PRN, Montserrat Moreno DO, 650 mg at 02/03/19 0620  •  aspirin EC tablet 81 mg, 81 mg, Oral, Daily, Montserrat Moreno DO, 81 mg at 02/03/19 0841  •  atorvastatin (LIPITOR) tablet 20 mg, 20 mg, Oral, Daily, Montserrat Moreno DO, 20 mg at 02/03/19 0841  •  calcium carbonate (TUMS) chewable tablet 500 mg (200 mg elemental), 1 tablet, Oral, TID PRN, Montserrat Moreno DO  •  metoprolol tartrate (LOPRESSOR) tablet 12.5 mg, 12.5 mg, Oral, Q12H, Montserrat Moreno DO, 12.5 mg at 02/03/19 0841  •  multivitamin (DAILY ONEYDA) tablet 1 tablet, 1 tablet, Oral, Daily, Montserrat Moreno DO, 1 tablet at 02/03/19 0841  •  pantoprazole (PROTONIX) EC tablet 40 mg, 40 mg, Oral, Q AM, Mary Rivas PA  •  sodium chloride 0.9 % flush 3 mL, 3 mL, Intravenous, Q12H, Montserrat Moreno DO, 3 mL at 02/03/19 0842  •  sodium chloride 0.9 % flush 3-10 mL, 3-10 mL, Intravenous, PRN, Montserrat Moreno DO    Review of Systems   Constitutional: Negative for diaphoresis and fatigue.   Eyes: Negative for pain and visual disturbance.   Respiratory: Positive for chest tightness. Negative for shortness of breath.    Cardiovascular: Positive for chest pain. Negative for palpitations and leg swelling.   Gastrointestinal:  Negative for abdominal pain and blood in stool.   Endocrine: Negative for polyuria.   Genitourinary: Negative for dysuria and hematuria.   Hematological: Negative for adenopathy. Does not bruise/bleed easily.   Psychiatric/Behavioral: Negative for agitation and confusion.       Objective      Vital Signs  Temp:  [97.4 °F (36.3 °C)-98.6 °F (37 °C)] 97.5 °F (36.4 °C)  Heart Rate:  [] 77  Resp:  [16-18] 18  BP: ()/(54-78) 111/72  Body mass index is 29.62 kg/m².  No intake or output data in the 24 hours ending 02/03/19 1336    Physical Exam   Constitutional: He is oriented to person, place, and time. He appears well-developed and well-nourished. No distress.   HENT:   Head: Normocephalic and atraumatic.   Neck: No JVD present. No tracheal deviation present.   Cardiovascular: Normal rate and normal heart sounds. An irregularly irregular rhythm present.   No murmur heard.  Pulmonary/Chest: Effort normal and breath sounds normal.   Musculoskeletal: He exhibits edema (1+ pedal edema bilaterally. ).   Neurological: He is alert and oriented to person, place, and time.   Skin: Skin is warm and dry. He is not diaphoretic.   Psychiatric: He has a normal mood and affect. His behavior is normal.     Results Review:   I reviewed the patient's new clinical results.  Results from last 7 days   Lab Units 02/03/19  1021 02/03/19  0416 02/03/19  0003 02/02/19 2155   CK TOTAL U/L 16* 23*  --   --    TROPONIN I ng/mL <0.006 <0.006 <0.006 <0.006   CKMB ng/mL 0.77 0.79  --   --    MYOGLOBIN ng/mL 43.0 44.0  --   --      Results from last 7 days   Lab Units 02/03/19  0416 02/02/19 2155   WBC 10*3/mm3 9.24 9.98   HEMOGLOBIN g/dL 14.0 14.6   PLATELETS 10*3/mm3 175 207     Results from last 7 days   Lab Units 02/03/19  0416 02/02/19 2155   SODIUM mmol/L 140 140   POTASSIUM mmol/L 3.7 4.0   CHLORIDE mmol/L 110 108   CO2 mmol/L 24.1* 25.5   BUN mg/dL 21 21   CREATININE mg/dL 1.07 1.15   CALCIUM mg/dL 8.9 9.1   GLUCOSE mg/dL 126*  124*   ALT (SGPT) U/L 16 26   AST (SGOT) U/L 15 23     Lab Results   Component Value Date    INR 1.05 02/02/2019     Lab Results   Component Value Date    MG 2.0 02/02/2019     Lab Results   Component Value Date    TSH 4.792 (H) 02/03/2019    TRIG 85 02/03/2019    HDL 31 (L) 02/03/2019    LDL 91 02/03/2019      No results found for: BNP    EKG:       Imaging Results (last 72 hours)     Procedure Component Value Units Date/Time    XR Chest 2 View [[id number]] Collected:  02/03/19 1005     Updated:  02/03/19 1007    Narrative:       EXAMINATION: XR CHEST 2 VW-      CLINICAL INDICATION:     Chest Pain triage protocol     TECHNIQUE:  XR CHEST 2 VW-      COMPARISON: NONE      FINDINGS:   The lungs remain aerated.  Heart and mediastinum contours are unremarkable.  No pleural effusion.  No pneumothorax.   Bony and soft tissue structures are unremarkable.       Impression:       No radiographic evidence of acute cardiac or pulmonary  disease.     This report was finalized on 2/3/2019 10:05 AM by Dr. Efe Braswell MD.              Thank you very much for asking us to be involved in this patient's care.  We will follow along with you.    Napoleon Delacruz PA-C  I discussed the case with Dr. Villasenor where he also reviewed the chart and saw the patient and together came up with a treatment plan.

## 2019-02-04 ENCOUNTER — APPOINTMENT (OUTPATIENT)
Dept: NUCLEAR MEDICINE | Facility: HOSPITAL | Age: 79
End: 2019-02-04

## 2019-02-04 ENCOUNTER — APPOINTMENT (OUTPATIENT)
Dept: CARDIOLOGY | Facility: HOSPITAL | Age: 79
End: 2019-02-04

## 2019-02-04 VITALS
HEART RATE: 98 BPM | SYSTOLIC BLOOD PRESSURE: 119 MMHG | WEIGHT: 206.4 LBS | TEMPERATURE: 98.7 F | BODY MASS INDEX: 28.9 KG/M2 | DIASTOLIC BLOOD PRESSURE: 76 MMHG | OXYGEN SATURATION: 96 % | HEIGHT: 71 IN | RESPIRATION RATE: 18 BRPM

## 2019-02-04 LAB
ALBUMIN SERPL-MCNC: 4.2 G/DL (ref 3.4–4.8)
ALBUMIN/GLOB SERPL: 1.7 G/DL (ref 1.5–2.5)
ALP SERPL-CCNC: 95 U/L (ref 40–129)
ALT SERPL W P-5'-P-CCNC: 18 U/L (ref 10–44)
ANION GAP SERPL CALCULATED.3IONS-SCNC: 8.4 MMOL/L (ref 3.6–11.2)
AST SERPL-CCNC: 18 U/L (ref 10–34)
BASOPHILS # BLD AUTO: 0.02 10*3/MM3 (ref 0–0.3)
BASOPHILS NFR BLD AUTO: 0.2 % (ref 0–2)
BH CV NUCLEAR PRIOR STUDY: 3
BH CV STRESS BP STAGE 1: NORMAL
BH CV STRESS BP STAGE 2: NORMAL
BH CV STRESS COMMENTS STAGE 1: NORMAL
BH CV STRESS COMMENTS STAGE 2: NORMAL
BH CV STRESS DOSE REGADENOSON STAGE 1: 0.4
BH CV STRESS DURATION MIN STAGE 1: 0
BH CV STRESS DURATION MIN STAGE 2: 4
BH CV STRESS DURATION SEC STAGE 1: 10
BH CV STRESS DURATION SEC STAGE 2: 0
BH CV STRESS HR STAGE 1: 99
BH CV STRESS HR STAGE 2: 90
BH CV STRESS PROTOCOL 1: NORMAL
BH CV STRESS RECOVERY BP: NORMAL MMHG
BH CV STRESS RECOVERY HR: 90 BPM
BH CV STRESS STAGE 1: 1
BH CV STRESS STAGE 2: 2
BILIRUB SERPL-MCNC: 1.6 MG/DL (ref 0.2–1.8)
BUN BLD-MCNC: 19 MG/DL (ref 7–21)
BUN/CREAT SERPL: 19.2 (ref 7–25)
CALCIUM SPEC-SCNC: 9.3 MG/DL (ref 7.7–10)
CHLORIDE SERPL-SCNC: 107 MMOL/L (ref 99–112)
CO2 SERPL-SCNC: 23.6 MMOL/L (ref 24.3–31.9)
CREAT BLD-MCNC: 0.99 MG/DL (ref 0.43–1.29)
DEPRECATED RDW RBC AUTO: 44.4 FL (ref 37–54)
EOSINOPHIL # BLD AUTO: 0.09 10*3/MM3 (ref 0–0.7)
EOSINOPHIL NFR BLD AUTO: 0.8 % (ref 0–7)
ERYTHROCYTE [DISTWIDTH] IN BLOOD BY AUTOMATED COUNT: 13.6 % (ref 11.5–14.5)
GFR SERPL CREATININE-BSD FRML MDRD: 73 ML/MIN/1.73
GLOBULIN UR ELPH-MCNC: 2.5 GM/DL
GLUCOSE BLD-MCNC: 105 MG/DL (ref 70–110)
HBA1C MFR BLD: 7.2 % (ref 4.5–5.7)
HCT VFR BLD AUTO: 42.6 % (ref 42–52)
HGB BLD-MCNC: 14 G/DL (ref 14–18)
IMM GRANULOCYTES # BLD AUTO: 0.04 10*3/MM3 (ref 0–0.03)
IMM GRANULOCYTES NFR BLD AUTO: 0.4 % (ref 0–0.5)
LV EF NUC BP: 61 %
LYMPHOCYTES # BLD AUTO: 2.17 10*3/MM3 (ref 1–3)
LYMPHOCYTES NFR BLD AUTO: 19 % (ref 16–46)
MAGNESIUM SERPL-MCNC: 2 MG/DL (ref 1.7–2.6)
MAXIMAL PREDICTED HEART RATE: 143 BPM
MCH RBC QN AUTO: 30.4 PG (ref 27–33)
MCHC RBC AUTO-ENTMCNC: 32.9 G/DL (ref 33–37)
MCV RBC AUTO: 92.6 FL (ref 80–94)
MONOCYTES # BLD AUTO: 1.75 10*3/MM3 (ref 0.1–0.9)
MONOCYTES NFR BLD AUTO: 15.4 % (ref 0–12)
NEUTROPHILS # BLD AUTO: 7.33 10*3/MM3 (ref 1.4–6.5)
NEUTROPHILS NFR BLD AUTO: 64.2 % (ref 40–75)
OSMOLALITY SERPL CALC.SUM OF ELEC: 280.2 MOSM/KG (ref 273–305)
PERCENT MAX PREDICTED HR: 69.23 %
PHOSPHATE SERPL-MCNC: 2.8 MG/DL (ref 2.7–4.5)
PLATELET # BLD AUTO: 209 10*3/MM3 (ref 130–400)
PMV BLD AUTO: 11.1 FL (ref 6–10)
POTASSIUM BLD-SCNC: 4.2 MMOL/L (ref 3.5–5.3)
PROT SERPL-MCNC: 6.7 G/DL (ref 6–8)
RBC # BLD AUTO: 4.6 10*6/MM3 (ref 4.7–6.1)
SODIUM BLD-SCNC: 139 MMOL/L (ref 135–153)
STRESS BASELINE BP: NORMAL MMHG
STRESS BASELINE HR: 89 BPM
STRESS PERCENT HR: 81 %
STRESS POST PEAK BP: NORMAL MMHG
STRESS POST PEAK HR: 99 BPM
STRESS TARGET HR: 122 BPM
T4 FREE SERPL-MCNC: 1.04 NG/DL (ref 0.89–1.76)
WBC NRBC COR # BLD: 11.4 10*3/MM3 (ref 4.5–12.5)

## 2019-02-04 PROCEDURE — 80053 COMPREHEN METABOLIC PANEL: CPT | Performed by: INTERNAL MEDICINE

## 2019-02-04 PROCEDURE — 99217 PR OBSERVATION CARE DISCHARGE MANAGEMENT: CPT | Performed by: PHYSICIAN ASSISTANT

## 2019-02-04 PROCEDURE — 78452 HT MUSCLE IMAGE SPECT MULT: CPT | Performed by: INTERNAL MEDICINE

## 2019-02-04 PROCEDURE — 0 TECHNETIUM SESTAMIBI: Performed by: INTERNAL MEDICINE

## 2019-02-04 PROCEDURE — 84439 ASSAY OF FREE THYROXINE: CPT | Performed by: INTERNAL MEDICINE

## 2019-02-04 PROCEDURE — 85025 COMPLETE CBC W/AUTO DIFF WBC: CPT | Performed by: INTERNAL MEDICINE

## 2019-02-04 PROCEDURE — 84100 ASSAY OF PHOSPHORUS: CPT | Performed by: INTERNAL MEDICINE

## 2019-02-04 PROCEDURE — 25010000002 REGADENOSON 0.4 MG/5ML SOLUTION: Performed by: INTERNAL MEDICINE

## 2019-02-04 PROCEDURE — 93018 CV STRESS TEST I&R ONLY: CPT | Performed by: INTERNAL MEDICINE

## 2019-02-04 PROCEDURE — 83036 HEMOGLOBIN GLYCOSYLATED A1C: CPT | Performed by: INTERNAL MEDICINE

## 2019-02-04 PROCEDURE — 83735 ASSAY OF MAGNESIUM: CPT | Performed by: INTERNAL MEDICINE

## 2019-02-04 PROCEDURE — A9500 TC99M SESTAMIBI: HCPCS | Performed by: INTERNAL MEDICINE

## 2019-02-04 PROCEDURE — 78452 HT MUSCLE IMAGE SPECT MULT: CPT

## 2019-02-04 PROCEDURE — 93017 CV STRESS TEST TRACING ONLY: CPT

## 2019-02-04 RX ADMIN — METOPROLOL TARTRATE 12.5 MG: 25 TABLET, FILM COATED ORAL at 07:10

## 2019-02-04 RX ADMIN — Medication 1 TABLET: at 07:10

## 2019-02-04 RX ADMIN — FLUTICASONE PROPIONATE 2 SPRAY: 50 SPRAY, METERED NASAL at 07:16

## 2019-02-04 RX ADMIN — ACETAMINOPHEN 650 MG: 325 TABLET ORAL at 14:35

## 2019-02-04 RX ADMIN — ASPIRIN 81 MG: 81 TABLET ORAL at 07:13

## 2019-02-04 RX ADMIN — SODIUM CHLORIDE, PRESERVATIVE FREE 3 ML: 5 INJECTION INTRAVENOUS at 07:16

## 2019-02-04 RX ADMIN — TECHNETIUM TC 99M SESTAMIBI 1 DOSE: 1 INJECTION INTRAVENOUS at 09:25

## 2019-02-04 RX ADMIN — REGADENOSON 0.4 MG: 0.08 INJECTION, SOLUTION INTRAVENOUS at 10:45

## 2019-02-04 RX ADMIN — ATORVASTATIN CALCIUM 20 MG: 20 TABLET, FILM COATED ORAL at 07:12

## 2019-02-04 RX ADMIN — TECHNETIUM TC 99M SESTAMIBI 1 DOSE: 1 INJECTION INTRAVENOUS at 10:45

## 2019-02-04 RX ADMIN — ACETAMINOPHEN 650 MG: 325 TABLET ORAL at 07:10

## 2019-02-04 NOTE — PAYOR COMM NOTE
"[facility]  NPI:[id number]    Utilization Review  Contact: Aleida Aguilar RN  Phone: [phone]  Fax:[phone]    INITIATE INPATIENT AUTHORIZATION  ICD: I48.91  DR MEL VILLA NPI: [id number]    Bill Sanford (79 y.o. Male)     Date of Birth Social Security Number Address Home Phone MRN    1940  [address]  [facility] [address] [phone] [id number]    Taoism Marital Status          None        Admission Date Admission Type Admitting Provider Attending Provider Department, Room/Bed    2/3/19 Emergency Mel Villa DO Troxell, Christopher A, DO [address], 3318/1P    Discharge Date Discharge Disposition Discharge Destination                       Attending Provider:  Osbaldo Woods DO    Allergies:  No Known Allergies    Isolation:  None   Infection:  None   Code Status:  CPR    Ht:  180.3 cm (70.98\")   Wt:  93.6 kg (206 lb 6.4 oz)    Admission Cmt:  None   Principal Problem:  None                Active Insurance as of 2019     Primary Coverage     Payor Plan Insurance Group Employer/Plan Group    ANTHEM MEDICARE REPLACEMENT ANTHEM MEDICARE ADVANTAGE [id number]     Payor Plan Address Payor Plan Phone Number Payor Plan Fax Number Effective Dates    [address] [phone]  2019 - None Entered    [facility] [id number]       Subscriber Name Subscriber Birth Date Member ID       BILL SANFORD 1940 [id number]                 Emergency Contacts      (Rel.) Home Phone Work Phone Mobile Phone    ALBINO SANFORD (Spouse) [phone] -- --               History & Physical      Mel Villa DO at 2/3/2019  3:51 AM          Hospitalist History and Physical    Patient Identification:  Name: Bill Sanford  Age/Sex: 79 y.o. male  :  1940  MRN: [id number]         Primary Care Physician: Zane Lipscomb FNP    Chief Complaint   Patient presents with   • Chest Pain       History of Present Illness  Patient is a " "79 y.o. male presents with the following: chest pain    The patient is a 80 yo male with PMHx significant for hypertension who presents to the emergency department with a four-day history of chest pain.    Please the patient is a fairly poor historian but states that he has had what he thought was \"heartburn\" in his chest for approximately 4 days.  His wife states that he has mentioned that there is a heaviness in his chest at times.  The patient denies any known inciting, exacerbating and/or alleviating factors.  The patient denies shortness of breath but the patient's wife states that at times he appears to be dyspneic.  He denies any palpitations.    The patient reports a long-standing history of chronic sinusitis.  He states that he has recently had a headache.  He denies fevers and/or chills.  No cough.  No vomiting, nausea, abdominal pain and/or diarrhea.    Patient denies previous known history of atrial fibrillation.  He denies any history of myocardial infarction.  No known history of diabetes mellitus and/or congestive heart failure.  He has never had a previous stroke.    The patient present to an urgent care clinic today. He was found to be in atrial fibrillation and encouraged to report to the ED.     The patient's maximum heart rate was documented in the ER was 103.  EKG revealed rate controlled atrial fibrillation. CMP was unremarkable except for a glucose of 124. He had two negative troponin levels. CBC was unremarkable. CXR was performed and per my view revealed chronic interstitial lung disease changes. No acute infiltrates and/or effusions per my view.     Present during visit: Patient's wife and TATE Bosch    Past History:  Past Medical History:   Diagnosis Date   • Hypertension      No past surgical history on file.  No family history on file.  Social History     Tobacco Use   • Smoking status: Never Smoker   • Smokeless tobacco: Never Used   Substance Use Topics   • Alcohol use: No     " Frequency: Never   • Drug use: No     Medications Prior to Admission   Medication Sig Dispense Refill Last Dose   • amLODIPine-valsartan (EXFORGE) 5-160 MG per tablet Take 1 tablet by mouth Daily.   2/2/2019 at am   • aspirin 81 MG EC tablet Take 81 mg by mouth Daily.   2/2/2019 at  am   • multivitamin (DAILY ONEYDA) tablet tablet Take 1 tablet by mouth Daily.   2/2/2019 at  am     Allergies: Patient has no known allergies.    Review of Systems:  Review of Systems   Constitutional: Negative for chills, diaphoresis and fever.   HENT: Negative for hearing loss, tinnitus and trouble swallowing.    Eyes: Negative for discharge and visual disturbance.   Respiratory: Negative for cough, shortness of breath and wheezing.    Cardiovascular: Positive for chest pain. Negative for palpitations and leg swelling.   Gastrointestinal: Negative for abdominal pain, constipation, diarrhea, nausea and vomiting.   Endocrine: Negative for polydipsia, polyphagia and polyuria.   Genitourinary: Negative for dysuria, frequency and hematuria.   Musculoskeletal: Negative for gait problem, myalgias and neck pain.   Skin: Negative for rash and wound.   Neurological: Negative for dizziness, tremors, seizures, syncope, weakness and light-headedness.   Hematological: Does not bruise/bleed easily.   Psychiatric/Behavioral: Negative for confusion, hallucinations and suicidal ideas.      Vital Signs  Temp:  [97.4 °F (36.3 °C)-98.6 °F (37 °C)] 97.4 °F (36.3 °C)  Heart Rate:  [] 97  Resp:  [18] 18  BP: ()/(54-78) 112/59  Body mass index is 29.62 kg/m².    Physical Exam:  Physical Exam   Constitutional: He is oriented to person, place, and time. He appears well-developed and well-nourished. No distress.   HENT:   Head: Normocephalic and atraumatic.   Mouth/Throat: Oropharynx is clear and moist.   Eyes: Conjunctivae and EOM are normal. Pupils are equal, round, and reactive to light.   Neck: Neck supple. No tracheal deviation present. No  thyromegaly present.   Cardiovascular: Normal rate. An irregularly irregular rhythm present. Exam reveals no gallop and no friction rub.   No murmur heard.  Pulmonary/Chest: Breath sounds normal. No respiratory distress. He has no wheezes. He has no rales.   Abdominal: Soft. Bowel sounds are normal. He exhibits no distension. There is no tenderness. There is no guarding.   Musculoskeletal: Normal range of motion. He exhibits no tenderness.   Neurological: He is alert and oriented to person, place, and time. No cranial nerve deficit.   Skin: Skin is warm and dry. No rash noted. No erythema.   Psychiatric: He has a normal mood and affect.      Results Review:    Results from last 7 days   Lab Units 02/02/19  2155   WBC 10*3/mm3 9.98   HEMOGLOBIN g/dL 14.6   HEMATOCRIT % 43.2   PLATELETS 10*3/mm3 207     Results from last 7 days   Lab Units 02/02/19  2155   SODIUM mmol/L 140   POTASSIUM mmol/L 4.0   CHLORIDE mmol/L 108   CO2 mmol/L 25.5   BUN mg/dL 21   CREATININE mg/dL 1.15   CALCIUM mg/dL 9.1   GLUCOSE mg/dL 124*     Results from last 7 days   Lab Units 02/02/19  2155   BILIRUBIN mg/dL 1.1   ALK PHOS U/L 111   AST (SGOT) U/L 23   ALT (SGPT) U/L 26         Results from last 7 days   Lab Units 02/02/19  2155   MAGNESIUM mg/dL 2.0     Results from last 7 days   Lab Units 02/03/19  0003 02/02/19  2155   TROPONIN I ng/mL <0.006 <0.006     Results from last 7 days   Lab Units 02/02/19  2155   INR  1.05       Imaging:    I have personally reviewed the EKG. Pending official cardiology interpretation, per my view: Rate controlled atrial fibrillation; no acute ST-T changes.     Imaging Results (most recent)     Procedure Component Value Units Date/Time    XR Chest 2 View [[id number]] Updated:  02/02/19 2120        *Pending official radiology interpretation; per my view: chronic lung disease changes; no acute infiltrates and/or effusions.     Assessment/Plan     -Presumed new onset atrial fibrillation with [id number] score of  approximately 3: Admit to telemetry.  Trend cardiac isoenzymes.  Obtain echocardiogram.  The patient received a full dose of Lovenox in the emergency department.  I will place him on a statin medication and low-dose metoprolol tartrate with holding parameters.  I will check a TSH and HgbA1c. Potassium and Magnesium level are within normal limits.  A cardiology consult has been placed for further recommendations.    -Essential hypertension that is currently controlled: I feel the patient's home medication and started metoprolol tartrate instead.    DVT prophylaxis: Full dose lovenox    Estimated Length of Stay: > 2 MNs    CODE: FULL/CPR    I discussed the patients findings and my recommendations with patient, family and nursing staff      Montserrat Moreno DO  02/03/19  3:51 AM    Electronically signed by Montserrat Moreno DO at 2/3/2019  4:38 AM          Emergency Department Notes      Jessica Chavez at 2/2/2019  8:35 PM        EKG performed by tech and shown to Jessica Bernal  02/02/19 2105      Electronically signed by Jessica Chavez at 2/2/2019  9:05 PM     Dustin Pimentel MD at 2/2/2019 11:14 PM          Subjective   Pt sent from urgent care for new onset A-fib.  Pt went to Urgent Care for chest pain/indigestion.  UC did an EKG and found A-Fib.  Pt does not have any history of A-fib or CAD.  Pt describes upper sub-sternal pressure.  Pt has had similar discomfort for a long time, worse for the past 3-4 days    Family has EKG from US showing A-Fib        History provided by:  Patient, spouse and relative  Chest Pain   Pain location:  Substernal area  Pain quality: pressure    Pain radiates to:  Does not radiate  Pain severity:  Moderate  Onset quality:  Unable to specify  Timing:  Unable to specify  Progression:  Unchanged  Chronicity:  Recurrent  Context: not breathing, not drug use, not eating, not intercourse, not lifting, not movement, not raising an arm, not at rest, not  stress and not trauma    Relieved by:  Nothing  Worsened by:  Nothing  Ineffective treatments:  None tried  Associated symptoms: heartburn and nausea    Associated symptoms: no abdominal pain, no AICD problem, no altered mental status, no anorexia, no anxiety, no back pain, no claudication, no cough, no diaphoresis, no dizziness, no dysphagia, no fatigue, no fever, no headache, no lower extremity edema, no near-syncope, no numbness, no orthopnea, no palpitations, no PND, no shortness of breath, no syncope, no vomiting and no weakness    Risk factors: hypertension    Risk factors: no aortic disease, no birth control, no coronary artery disease, no diabetes mellitus, no Og-Danlos syndrome, no high cholesterol, no immobilization, not male, no Marfan's syndrome, not obese, not pregnant, no prior DVT/PE, no smoking and no surgery        Review of Systems   Constitutional: Negative.  Negative for diaphoresis, fatigue and fever.   HENT: Positive for congestion and postnasal drip. Negative for nosebleeds, trouble swallowing and voice change.    Eyes: Negative.    Respiratory: Positive for chest tightness. Negative for cough and shortness of breath.    Cardiovascular: Positive for chest pain. Negative for palpitations, orthopnea, claudication, syncope, PND and near-syncope.   Gastrointestinal: Positive for heartburn and nausea. Negative for abdominal pain, anorexia and vomiting.   Endocrine: Negative.    Genitourinary: Negative.    Musculoskeletal: Negative.  Negative for back pain.   Skin: Negative.    Allergic/Immunologic: Negative.    Neurological: Negative.  Negative for dizziness, weakness, numbness and headaches.   Hematological: Negative.    Psychiatric/Behavioral: Negative.    All other systems reviewed and are negative.      Past Medical History:   Diagnosis Date   • Hypertension        No Known Allergies    No past surgical history on file.    No family history on file.    Social History     Socioeconomic  History   • Marital status:      Spouse name: Not on file   • Number of children: Not on file   • Years of education: Not on file   • Highest education level: Not on file   Tobacco Use   • Smoking status: Never Smoker   • Smokeless tobacco: Never Used   Substance and Sexual Activity   • Alcohol use: No     Frequency: Never   • Drug use: No   • Sexual activity: Defer           Objective   Physical Exam   Constitutional: He is oriented to person, place, and time. He appears well-developed and well-nourished.   HENT:   Head: Normocephalic and atraumatic.   Eyes: EOM are normal.   Neck: Normal range of motion. Neck supple. No tracheal deviation present.   Cardiovascular: An irregularly irregular rhythm present. Tachycardia present. Exam reveals no S4.   No murmur heard.   No systolic murmur is present.   No diastolic murmur is present.  Pulses:       Radial pulses are 1+ on the right side, and 1+ on the left side.   Pulmonary/Chest: Effort normal and breath sounds normal. No accessory muscle usage or stridor. No tachypnea. No respiratory distress. He has no decreased breath sounds. He has no wheezes. He has no rhonchi. He has no rales.   Abdominal: Soft. Bowel sounds are normal. He exhibits distension. There is no tenderness. There is no guarding.   Musculoskeletal: Normal range of motion.        Right lower leg: Normal. He exhibits no edema.        Left lower leg: Normal. He exhibits no edema.   Lymphadenopathy:     He has no cervical adenopathy.   Neurological: He is alert and oriented to person, place, and time. He is not disoriented. No cranial nerve deficit.   Skin: Skin is warm and dry. Capillary refill takes less than 2 seconds. No rash noted. No cyanosis. No pallor.   Psychiatric: He has a normal mood and affect. His behavior is normal. His mood appears not anxious. He is not agitated.   Nursing note and vitals reviewed.      Procedures          ED Course  ED Course as of Feb 03 0735   Sun Feb 03, 2019    0206 Patient is hemodynamically stable.  He remains in rate controlled atrial fibrillation with a heart rate of 85-92.  Oxygen saturation 94% on room air.  Respiratory rate 20.  Blood pressure 112/71.  Negative serial cardiac enzymes.  [TZ]   0222 D/W Dr Moreno accepting for med/surg with tele  [TZ]   0256 12-lead EKG performed at 2036 hrs.  Atrial fibrillation.  Ventricular rate 98.  QRS duration 104.  .  QTc 421.  No pathologic blocks.  Patient is in sustained, rate controlled atrial fibrillation.  No acute ischemic ST segment elevation.  No overt criteria for STEMI/infarct. ECG 12 Lead [TZ]      ED Course User Index  [TZ] Dustin Pimentel MD      XR Chest 2 View    (Results Pending)     Labs Reviewed   COMPREHENSIVE METABOLIC PANEL - Abnormal; Notable for the following components:       Result Value    Glucose 124 (*)     All other components within normal limits    Narrative:     The MDRD GFR formula is only valid for adults with stable renal function between ages 18 and 70.   CBC WITH AUTO DIFFERENTIAL - Abnormal; Notable for the following components:    MPV 11.1 (*)     Monocyte % 14.8 (*)     Monocytes, Absolute 1.48 (*)     All other components within normal limits   APTT - Abnormal; Notable for the following components:    PTT 36.2 (*)     All other components within normal limits    Narrative:     PTT Heparin Therapeutic Range:  59 - 95 seconds   URINALYSIS W/ MICROSCOPIC IF INDICATED (NO CULTURE) - Abnormal; Notable for the following components:    Color, UA Dark Yellow (*)     Appearance, UA Cloudy (*)     Ketones, UA Trace (*)     All other components within normal limits    Narrative:     Urine microscopic not indicated.   COMPREHENSIVE METABOLIC PANEL - Abnormal; Notable for the following components:    Glucose 126 (*)     CO2 24.1 (*)     All other components within normal limits    Narrative:     The MDRD GFR formula is only valid for adults with stable renal function between ages 18  and 70.   CBC WITH AUTO DIFFERENTIAL - Abnormal; Notable for the following components:    RBC 4.56 (*)     Hematocrit 41.6 (*)     MPV 11.0 (*)     Monocyte % 14.2 (*)     Monocytes, Absolute 1.31 (*)     All other components within normal limits   CK - Abnormal; Notable for the following components:    Creatine Kinase 23 (*)     All other components within normal limits   TSH - Abnormal; Notable for the following components:    TSH 4.792 (*)     All other components within normal limits   LIPID PANEL - Abnormal; Notable for the following components:    HDL Cholesterol 31 (*)     All other components within normal limits    Narrative:     Cholesterol Reference Ranges  (U.S. Department of Health and Human Services ATP III Classifications)    Desirable          <200 mg/dL  Borderline High    200-239 mg/dL  High Risk          >240 mg/dL      Triglyceride Reference Ranges  (U.S. Department of Health and Human Services ATP III Classifications)    Normal           <150 mg/dL  Borderline High  150-199 mg/dL  High             200-499 mg/dL  Very High        >500 mg/dL    HDL Reference Ranges  (U.S. Department of Health and Human Services ATP III Classifcations)    Low     <40 mg/dl (major risk factor for CHD)  High    >60 mg/dl ('negative' risk factor for CHD)        LDL Reference Ranges  (U.S. Department of Health and Human Services ATP III Classifcations)    Optimal          <100 mg/dL  Near Optimal     100-129 mg/dL  Borderline High  130-159 mg/dL  High             160-189 mg/dL  Very High        >189 mg/dL   CKMB INDEX CALCULATION - Abnormal; Notable for the following components:    CK-MB Index 3.4 (*)     All other components within normal limits   TROPONIN (IN-HOUSE) - Normal    Narrative:     Ultra Troponin I Reference Range:         <=0.039 ng/mL: Negative    0.04-0.779 ng/mL: Indeterminate Range. Suspicious of MI.  Clinical correlation required.       >=0.78  ng/mL: Consistent with myocardial injury.  Clinical  correlation required.   PROTIME-INR - Normal    Narrative:     Suggested INR therapeutic range for stable oral anticoagulant therapy:    Low Intensity therapy:   1.5-2.0  Moderate Intensity therapy:   2.0-3.0  High Intensity therapy:   2.5-4.0   D-DIMER, QUANTITATIVE - Normal    Narrative:     d-Dimer assay result is to be used in conjunction with a non-high clinical pretest probability (PTP) assessment tool to exclude PE and aid in diagnosis of VTE with cutoff of 0.5 MCGFEU/mL.   MAGNESIUM - Normal   OSMOLALITY, CALCULATED - Normal   TROPONIN (IN-HOUSE) - Normal    Narrative:     Ultra Troponin I Reference Range:         <=0.039 ng/mL: Negative    0.04-0.779 ng/mL: Indeterminate Range. Suspicious of MI.  Clinical correlation required.       >=0.78  ng/mL: Consistent with myocardial injury.  Clinical correlation required.   CK MB - Normal   MYOGLOBIN, SERUM - Normal   TROPONIN (IN-HOUSE) - Normal    Narrative:     Ultra Troponin I Reference Range:         <=0.039 ng/mL: Negative    0.04-0.779 ng/mL: Indeterminate Range. Suspicious of MI.  Clinical correlation required.       >=0.78  ng/mL: Consistent with myocardial injury.  Clinical correlation required.   OSMOLALITY, CALCULATED - Normal   RAINBOW DRAW    Narrative:     The following orders were created for panel order [place] Draw.  Procedure                               Abnormality         Status                     ---------                               -----------         ------                     Light Blue Top[[id number]]                                   Final result               Green Top (Gel)[[id number]]                                  Final result               Lavender Top[[id number]]                                     Final result               Gold Top - SST[[id number]]                                   Final result                 Please view results for these tests on the individual orders.   CK   CK MB   MYOGLOBIN, SERUM   TROPONIN (IN-HOUSE)    CBC AND DIFFERENTIAL    Narrative:     The following orders were created for panel order CBC & Differential.  Procedure                               Abnormality         Status                     ---------                               -----------         ------                     CBC Auto Differential[[id number]]        Abnormal            Final result                 Please view results for these tests on the individual orders.   LIGHT BLUE TOP   GREEN TOP   LAVENDER TOP   GOLD TOP - SST   CBC AND DIFFERENTIAL    Narrative:     The following orders were created for panel order CBC & Differential.  Procedure                               Abnormality         Status                     ---------                               -----------         ------                     CBC Auto Differential[[id number]]        Abnormal            Final result                 Please view results for these tests on the individual orders.        Medication List      ASK your doctor about these medications    amLODIPine-valsartan 5-160 MG per tablet  Commonly known as:  EXFORGE     aspirin 81 MG EC tablet     multivitamin tablet tablet                    MDM  Number of Diagnoses or Management Options  New onset atrial fibrillation (CMS/HCC): new and requires workup     Amount and/or Complexity of Data Reviewed  Clinical lab tests: ordered and reviewed  Tests in the radiology section of CPT®:  ordered and reviewed  Obtain history from someone other than the patient: yes  Discuss the patient with other providers: yes    Risk of Complications, Morbidity, and/or Mortality  Presenting problems: high  Diagnostic procedures: high  Management options: high    Patient Progress  Patient progress: stable (Fair  )        Final diagnoses:   New onset atrial fibrillation (CMS/HCC)            Dustin Pimentel MD  02/03/19 0735      Electronically signed by Dustin Pimentel MD at 2/3/2019  7:35 AM     Chary Mckeon, RN at 2/3/2019   2:40 AM        PT NEW BED ASSIGNMENT  PRIVATE ROOM     Chary Mckeon RN  02/03/19 0241      Electronically signed by Chary Mckeon, RN at 2/3/2019  2:41 AM     Chary Mckeon RN at 2/3/2019  6:18 AM        0246 PT IS AAO X4 PT HAS NO SIGNS OF DISTRESS BREATHING IS EQUAL AND  UNLABORED SKIN PWD     Chary Mckeon RN  02/03/19 0618      Electronically signed by Chary Mckeon, RN at 2/3/2019  6:18 AM       ICU Vital Signs     Row Name 02/04/19 1109 02/04/19 0607 02/04/19 0309 02/03/19 2015 02/03/19 1907       Height and Weight    Weight  --  --  93.6 kg (206 lb 6.4 oz)  --  --    Weight Method  --  --  Bed scale  --  --       Vitals    Temp  -- pT OFF THE UNIT  98.7 °F (37.1 °C)  98.8 °F (37.1 °C)  --  --    Temp src  --  Oral  Oral  --  --    Pulse  --  105  103  --  --    Heart Rate Source  --  Monitor  Monitor  --  --    Resp  --  18  18  --  --    Resp Rate Source  --  Visual  Visual  --  --    BP  --  128/54  119/69  --  --    BP Location  --  Left arm  --  --  --    BP Method  --  Automatic  --  --  --    Patient Position  --  Sitting  --  --  --       Oxygen Therapy    SpO2  --  97 %  96 %  --  97 %    Pulse Oximetry Type  --  --  --  --  Continuous    Device (Oxygen Therapy)  --  --  --  room air  room air    Row Name 02/03/19 1809 02/03/19 1630                Vitals    Temp  97.9 °F (36.6 °C)  98 °F (36.7 °C)       Temp src  Oral  Oral       Pulse  88  75       Heart Rate Source  Monitor  Monitor       Resp  18  18       Resp Rate Source  Visual  Visual       BP  116/71  117/70       Noninvasive MAP (mmHg)  --  82       BP Location  --  Right arm       BP Method  --  Automatic       Patient Position  --  Lying          Oxygen Therapy    SpO2  97 %  92 %       Device (Oxygen Therapy)  --  room air           Hospital Medications (all)       Dose Frequency Start End    acetaminophen (TYLENOL) tablet 650 mg 650 mg Every 6 Hours PRN 2/3/2019     Sig - Route: Take 2  tablets by mouth Every 6 (Six) Hours As Needed for Mild Pain  or Headache. - Oral    aspirin chewable tablet 324 mg 324 mg Once 2/2/2019 2/2/2019    Sig - Route: Chew 4 tablets 1 (One) Time. - Oral    aspirin EC tablet 81 mg 81 mg Daily 2/3/2019     Sig - Route: Take 1 tablet by mouth Daily. - Oral    atorvastatin (LIPITOR) tablet 20 mg 20 mg Daily 2/3/2019     Sig - Route: Take 1 tablet by mouth Daily. - Oral    calcium carbonate (TUMS) chewable tablet 500 mg (200 mg elemental) 1 tablet 3 Times Daily PRN 2/3/2019     Sig - Route: Chew 500 mg 3 (Three) Times a Day As Needed for Indigestion or Heartburn. - Oral    enoxaparin (LOVENOX) syringe 100 mg 1 mg/kg × 95.3 kg Once 2/2/2019 2/3/2019    Sig - Route: Inject 1 mL under the skin into the appropriate area as directed 1 (One) Time. - Subcutaneous    enoxaparin (LOVENOX) syringe 100 mg 1 mg/kg × 96.3 kg Every 12 Hours Scheduled 2/3/2019     Sig - Route: Inject 1 mL under the skin into the appropriate area as directed Every 12 (Twelve) Hours. - Subcutaneous    fluticasone (FLONASE) 50 MCG/ACT nasal spray 2 spray 2 spray Daily 2/3/2019     Sig - Route: 2 sprays by Each Nare route Daily. - Each Nare    Cosign for Ordering: Accepted by Osbaldo Woods DO on 2/3/2019  6:01 PM    metoprolol tartrate (LOPRESSOR) tablet 12.5 mg 12.5 mg Every 12 Hours Scheduled 2/3/2019     Sig - Route: Take 0.5 tablets by mouth Every 12 (Twelve) Hours. - Oral    multivitamin (DAILY ONEYDA) tablet 1 tablet 1 tablet Daily 2/3/2019     Sig - Route: Take 1 tablet by mouth Daily. - Oral    pantoprazole (PROTONIX) EC tablet 40 mg 40 mg Every Early Morning 2/3/2019     Sig - Route: Take 1 tablet by mouth Every Morning. - Oral    Cosign for Ordering: Accepted by Osbaldo Woods DO on 2/3/2019  1:55 PM    Pharmacy Consult  Continuous PRN 2/3/2019     Sig - Route: Continuous As Needed for Consult. - Does not apply    Cosign for Ordering: Required by Dejon Villasenor MD    regadenoson  (LEXISCAN) injection 0.4 mg 0.4 mg Once in Imaging 2/4/2019 2/4/2019    Sig - Route: Infuse 5 mL into a venous catheter Once. - Intravenous    sodium chloride 0.9 % flush 3 mL 3 mL Every 12 Hours Scheduled 2/3/2019     Sig - Route: Infuse 3 mL into a venous catheter Every 12 (Twelve) Hours. - Intravenous    sodium chloride 0.9 % flush 3-10 mL 3-10 mL As Needed 2/3/2019     Sig - Route: Infuse 3-10 mL into a venous catheter As Needed for Line Care. - Intravenous    technetium sestamibi (CARDIOLITE) injection 1 dose 1 dose Once in Imaging 2/4/2019 2/4/2019    Sig - Route: Infuse 1 dose into a venous catheter Once. - Intravenous    technetium sestamibi (CARDIOLITE) injection 1 dose 1 dose Once in Imaging 2/4/2019 2/4/2019    Sig - Route: Infuse 1 dose into a venous catheter Once. - Intravenous             Physician Progress Notes (all)      Osbaldo Woods DO at 2/3/2019  6:03 PM        Pt seen and examined with TATE Degroot. I have reviewed Dr. Moreno's H&P and discussed pt's care with her personally. Pt admitted overnight with new onset Afib and chest pain. Pt reports he has been having some indigestion. Family at bedside reports chest tightness. Serial troponin's are negative. Echo pending. Electrolytes are stable. TSH mildly elevated, will order free T4. Currently rate controlled. Cardiology has ordered nuclear stress test for the AM and asked pharmacy to evaluate the cost of the novel anticoagulants. Will cont therapeutic Lovenox for now. Monitor on telemetry. Cardiology input appreciated.       Osbaldo Woods DO  02/03/19  6:03 PM                Electronically signed by Osbaldo Woods DO at 2/3/2019  6:05 PM          Consult Notes (all)      Napoleon Delacruz PA-C at 2/3/2019  1:36 PM            Consults  Date of Admit: 2/2/2019  Date of Consult: 02/03/19  Dustin Pimentel, *  Bill Sanford  1940  Consulting Physician: Dr. Villasenor    Cardiology consultation    Reason for consultation:  New onset atrial fibrillation   Assessment:  1. New onset atrial fibrillation [id number] score 3 for age and essential hypertension.  2. Essential hypertension, controlled  3. Chest pains, troponin negative x4      Recommendations:  1. Will check on the cost on newer anticoagulants such as Eliquis her Xarelto.  2. Transthoracic echocardiogram still pending.   3. Will evaulate patient's chest pains in the morning and will make patient NPO after midnight.     History of Present Illness    Subjective     Chief Complaint   Patient presents with   • Chest Pain       Bill Sanford is a 79 y.o. male with past medical history significant for no known history of ASCVD, diabetes mellitus, heart failure, or history of TIA/stroke. He does admit history of tobacco abuse but quit 15 years ago. He does have history of essential hypertension and is 79 years of age making his [id number] score 3.     Patient states that he came into the emergency room after going to an urgent care facility who found the patient atrial fibrillation for which she denies any history of this.  He states for the past week or so he has been getting chest pain that he describes as a burning/pressure that he assumed was indigestion. He denies any radiation of this pain and also denies associated symptoms of shortness of breath or diaphoresis. He states that this pain has been constant and has no relation to exertion.      Cardiac risk factors:hypertension and Obesity        Past Medical History:   Diagnosis Date   • Hypertension      No past surgical history on file.  No family history on file.  Social History     Tobacco Use   • Smoking status: Never Smoker   • Smokeless tobacco: Never Used   Substance Use Topics   • Alcohol use: No     Frequency: Never   • Drug use: No     Medications Prior to Admission   Medication Sig Dispense Refill Last Dose   • amLODIPine-valsartan (EXFORGE) 5-160 MG per tablet Take 1 tablet by mouth Daily.   2/2/2019 at am   •  aspirin 81 MG EC tablet Take 81 mg by mouth Daily.   2/2/2019 at  am   • multivitamin (DAILY ONEYDA) tablet tablet Take 1 tablet by mouth Daily.   2/2/2019 at  am   • omeprazole (priLOSEC) 20 MG capsule Take 20 mg by mouth Daily.   2/2/2019 at Unknown time   • spironolactone (ALDACTONE) 25 MG tablet Take 25 mg by mouth Daily.   2/2/2019 at Unknown time     Allergies:  Patient has no known allergies.      Current Facility-Administered Medications:   •  acetaminophen (TYLENOL) tablet 650 mg, 650 mg, Oral, Q6H PRN, Montserrat Moreno DO, 650 mg at 02/03/19 0620  •  aspirin EC tablet 81 mg, 81 mg, Oral, Daily, Montserrat Moreno DO, 81 mg at 02/03/19 0841  •  atorvastatin (LIPITOR) tablet 20 mg, 20 mg, Oral, Daily, Montserrat Moreno DO, 20 mg at 02/03/19 0841  •  calcium carbonate (TUMS) chewable tablet 500 mg (200 mg elemental), 1 tablet, Oral, TID PRN, Montserrat Moreno DO  •  metoprolol tartrate (LOPRESSOR) tablet 12.5 mg, 12.5 mg, Oral, Q12H, Montserrat Moreno DO, 12.5 mg at 02/03/19 0841  •  multivitamin (DAILY ONEYDA) tablet 1 tablet, 1 tablet, Oral, Daily, Montserrat Moreno DO, 1 tablet at 02/03/19 0841  •  pantoprazole (PROTONIX) EC tablet 40 mg, 40 mg, Oral, Q AM, Mary Rivas PA  •  sodium chloride 0.9 % flush 3 mL, 3 mL, Intravenous, Q12H, Montserrat Moreno DO, 3 mL at 02/03/19 0842  •  sodium chloride 0.9 % flush 3-10 mL, 3-10 mL, Intravenous, PRN, Montserrat Moreno DO    Review of Systems   Constitutional: Negative for diaphoresis and fatigue.   Eyes: Negative for pain and visual disturbance.   Respiratory: Positive for chest tightness. Negative for shortness of breath.    Cardiovascular: Positive for chest pain. Negative for palpitations and leg swelling.   Gastrointestinal: Negative for abdominal pain and blood in stool.   Endocrine: Negative for polyuria.   Genitourinary: Negative for dysuria and hematuria.   Hematological: Negative for adenopathy. Does not bruise/bleed  easily.   Psychiatric/Behavioral: Negative for agitation and confusion.       Objective      Vital Signs  Temp:  [97.4 °F (36.3 °C)-98.6 °F (37 °C)] 97.5 °F (36.4 °C)  Heart Rate:  [] 77  Resp:  [16-18] 18  BP: ()/(54-78) 111/72  Body mass index is 29.62 kg/m².  No intake or output data in the 24 hours ending 02/03/19 1336    Physical Exam   Constitutional: He is oriented to person, place, and time. He appears well-developed and well-nourished. No distress.   HENT:   Head: Normocephalic and atraumatic.   Neck: No JVD present. No tracheal deviation present.   Cardiovascular: Normal rate and normal heart sounds. An irregularly irregular rhythm present.   No murmur heard.  Pulmonary/Chest: Effort normal and breath sounds normal.   Musculoskeletal: He exhibits edema (1+ pedal edema bilaterally. ).   Neurological: He is alert and oriented to person, place, and time.   Skin: Skin is warm and dry. He is not diaphoretic.   Psychiatric: He has a normal mood and affect. His behavior is normal.     Results Review:   I reviewed the patient's new clinical results.  Results from last 7 days   Lab Units 02/03/19  1021 02/03/19  0416 02/03/19  0003 02/02/19 2155   CK TOTAL U/L 16* 23*  --   --    TROPONIN I ng/mL <0.006 <0.006 <0.006 <0.006   CKMB ng/mL 0.77 0.79  --   --    MYOGLOBIN ng/mL 43.0 44.0  --   --      Results from last 7 days   Lab Units 02/03/19  0416 02/02/19  2155   WBC 10*3/mm3 9.24 9.98   HEMOGLOBIN g/dL 14.0 14.6   PLATELETS 10*3/mm3 175 207     Results from last 7 days   Lab Units 02/03/19  0416 02/02/19 2155   SODIUM mmol/L 140 140   POTASSIUM mmol/L 3.7 4.0   CHLORIDE mmol/L 110 108   CO2 mmol/L 24.1* 25.5   BUN mg/dL 21 21   CREATININE mg/dL 1.07 1.15   CALCIUM mg/dL 8.9 9.1   GLUCOSE mg/dL 126* 124*   ALT (SGPT) U/L 16 26   AST (SGOT) U/L 15 23     Lab Results   Component Value Date    INR 1.05 02/02/2019     Lab Results   Component Value Date    MG 2.0 02/02/2019     Lab Results   Component  Value Date    TSH 4.792 (H) 02/03/2019    TRIG 85 02/03/2019    HDL 31 (L) 02/03/2019    LDL 91 02/03/2019      No results found for: BNP    EKG:       Imaging Results (last 72 hours)     Procedure Component Value Units Date/Time    XR Chest 2 View [[id number]] Collected:  02/03/19 1005     Updated:  02/03/19 1007    Narrative:       EXAMINATION: XR CHEST 2 VW-      CLINICAL INDICATION:     Chest Pain triage protocol     TECHNIQUE:  XR CHEST 2 VW-      COMPARISON: NONE      FINDINGS:   The lungs remain aerated.  Heart and mediastinum contours are unremarkable.  No pleural effusion.  No pneumothorax.   Bony and soft tissue structures are unremarkable.       Impression:       No radiographic evidence of acute cardiac or pulmonary  disease.     This report was finalized on 2/3/2019 10:05 AM by Dr. Efe Braswell MD.              Thank you very much for asking us to be involved in this patient's care.  We will follow along with you.    Napoleon Delacruz PA-C  I discussed the case with Dr. Villasenor where he also reviewed the chart and saw the patient and together came up with a treatment plan.     Electronically signed by Napoleon Delacruz PA-C at 2/3/2019  2:16 PM

## 2019-02-04 NOTE — DISCHARGE SUMMARY
[facility] Medicine Services  DISCHARGE SUMMARY    Date of Admission: 2/2/2019    Date of Discharge:  2/5/2019    PCP: Timo Pérez MD    Discharging Provider: Dr. Osbaldo Woods/ Marleny Alvarado PA-C     Admission Diagnosis:   Please see admission H&P    Discharge Diagnosis:   · New onset atrial fibrillation with controlled rate  · Essential hypertension  · Newly diagnosed diabetes mellitus type 2 with a1c of 7.2%  · Chest pain, improved      Procedures Performed:  -Stress test on 2/4/19       Results for orders placed during the hospital encounter of 02/02/19   Adult Transthoracic Echo Complete W/ Cont if Necessary Per Protocol    Narrative · Left atrial cavity size is mild-to-moderately dilated.  · Left ventricle not well visualized. Estimated EF appears to be in the   range of 51 - 55%. Normal left ventricular cavity size and wall thickness   noted.              Consults:   Consults     No orders found from 1/4/2019 to 2/3/2019.            HPI     History of Present Illness:  Bill Sanford is a 79 y.o. male with past medical history significant for hypertension who presented to the ED at this facility with reported 4 days of chest pain that he thought was heartburn.  He reported a heaviness in his chest.  Cardiac isoenzymes were negative.  EKG revealed atrial fibrillation.        Hospital Course     Hospital Course  Bill Sanford was admitted as outlined in above HPI to the telemetry floor. He was started on full dose Lovenox for anticoagulation 2/2 [id number] at least 3. He was started on Metoprolol and atorvastatin.  A cardiology consultation was placed. A stress test was ordered in addition to echocardiogram.  Pharmacy was consulted to evaluated for cost of novel anticoagulants. TSH was within normal limits.  Hemoglobin a1c was mildly elevated.     On 2/4/19, Mr. Sanford underwent Regadenoson Stress Test with Myocardial Perfusion.  This was read by Dr. Rosado and found to be a  low risk study.  He was chest pain free and felt improved during hospitalization.  He was discharged home on 2/4/19.      Mr. Sanford was educated about his hemoglobin a1c elevation at 7.2%.  Diet and exercised were encouraged. Mr. Sanford voiced that he was familiar with diabetes mellitus, as his wife has this.  He was educated that with current hemoglobin a1c, he can pursue diet and exercise as an option prior to being started on oral medications, but he will need close follow-up.      Please see discharge medication reconciliation list. Mr. Sanford was started on Eliquis at discharge. He was educated that he would be provided with a prescription for 30 days and would need further follow-up with PCP and Cardiology for further refills following initial hospitalization. He was provided with appointment in 2 weeks with Cardiology locally with option to cancel if he found a cardiologist he preferred to see near [place]. Importance of follow-up was voiced.     Mr. Sanford's daughter and wife were present during discharge evaluation.    Discussed with AM TATE Degroot on day of discharge.     Telemetry on day of discharge revealed Atrial fibrillation in the 90s.     Oxygen saturation on the day of discharge 92-98% on room air.      Pertinent Laboratory and Radiology Results     Pertinent Test Results:          Results from last 7 days   Lab Units 02/04/19  0508 02/03/19  0416 02/02/19  2155   WBC 10*3/mm3 11.40 9.24 9.98   HEMOGLOBIN g/dL 14.0 14.0 14.6   HEMATOCRIT % 42.6 41.6* 43.2   PLATELETS 10*3/mm3 209 175 207   MCV fL 92.6 91.2 91.1   SODIUM mmol/L 139 140 140   POTASSIUM mmol/L 4.2 3.7 4.0   CHLORIDE mmol/L 107 110 108   CO2 mmol/L 23.6* 24.1* 25.5   BUN mg/dL 19 21 21   CREATININE mg/dL 0.99 1.07 1.15   GLUCOSE mg/dL 105 126* 124*   CALCIUM mg/dL 9.3 8.9 9.1        Results from last 7 days   Lab Units 02/03/19  1557 02/03/19  1021 02/03/19  0416 02/03/19  0003 02/02/19  2155   CK TOTAL U/L 16* 16* 23*  --   --    TROPONIN I  ng/mL <0.006 <0.006 <0.006 <0.006 <0.006   CK MB INDEX % 4.6* 4.8* 3.4*  --   --    MYOGLOBIN ng/mL 43.0 43.0 44.0  --   --      Results from last 7 days   Lab Units 02/04/19  0508 02/03/19  0416 02/02/19  2155   WBC 10*3/mm3 11.40 9.24 9.98     Results from last 7 days   Lab Units 02/04/19  0508 02/03/19  0416 02/02/19  2155   BILIRUBIN mg/dL 1.6 0.9 1.1   ALK PHOS U/L 95 100 111   ALT (SGPT) U/L 18 16 26   AST (SGOT) U/L 18 15 23   PROTIME Seconds  --   --  14.0   INR   --   --  1.05   APTT seconds  --   --  36.2*     Results from last 7 days   Lab Units 02/03/19  0416   CHOLESTEROL mg/dL 139   TRIGLYCERIDES mg/dL 85   HDL CHOL mg/dL 31*     Results from last 7 days   Lab Units 02/04/19  0508 02/03/19  1557 02/03/19  1021 02/03/19  0416 02/03/19  0003 02/02/19  2155   TSH mIU/mL  --   --   --  4.792*  --   --    HEMOGLOBIN A1C % 7.20*  --   --   --   --   --    TROPONIN I ng/mL  --  <0.006 <0.006 <0.006 <0.006 <0.006     Brief Urine Lab Results  (Last result in the past 365 days)      Color   Clarity   Blood   Leuk Est   Nitrite   Protein   CREAT   Urine HCG        02/02/19 2325 Dark Yellow Cloudy Negative Negative Negative Negative                        Results for orders placed during the hospital encounter of 02/02/19   Adult Transthoracic Echo Complete W/ Cont if Necessary Per Protocol    Narrative · Left atrial cavity size is mild-to-moderately dilated.  · Left ventricle not well visualized. Estimated EF appears to be in the   range of 51 - 55%. Normal left ventricular cavity size and wall thickness   noted.                ----------------------------------------------------------------------------------------------------------------------  Xr Chest 2 View    Result Date: 2/3/2019  No radiographic evidence of acute cardiac or pulmonary disease.  This report was finalized on 2/3/2019 10:05 AM by Dr. Efe Braswell MD.          Microbiology Results (last 10 days)     ** No results found for the last 240 hours.  **            Discharge Vitals and Physical Examination       Vital Signs  Heart Rate:  [98] 98  Resp:  [18] 18  BP: (119)/(76) 119/76     Physical Exam:  General:    Awake, alert, in no acute distress   Heart:      Normal S1 and S2. Irregularly, irregular.. No significant murmur, rubs or gallops appreciated.   Lungs:     Respirations regular, even and unlabored. Lungs clear to auscultation B/L. No wheezes, rales or rhonchi.   Abdomen:   Soft and nontender. No guarding, rebound tenderness or  organomegaly noted. Bowel sounds present x 4.   Extremities:  No clubbing, cyanosis or edema noted. Moves UE and LE equally B/L.         Discharge Disposition, Discharge Medications, and Discharge Appointments     Discharge Disposition:   home    Condition on Discharge:  Fair    Discharge Medications:     Discharge Medications      New Medications      Instructions Start Date   apixaban 5 MG tablet tablet  Commonly known as:  ELIQUIS   5 mg, Oral, Every 12 Hours Scheduled      metoprolol tartrate 25 MG tablet  Commonly known as:  LOPRESSOR   12.5 mg, Oral, Every 12 Hours Scheduled         Continue These Medications      Instructions Start Date   aspirin 81 MG EC tablet   81 mg, Oral, Daily      multivitamin tablet tablet   1 tablet, Oral, Daily      omeprazole 20 MG capsule  Commonly known as:  priLOSEC   20 mg, Oral, Daily         Stop These Medications    amLODIPine-valsartan 5-160 MG per tablet  Commonly known as:  EXFORGE     spironolactone 25 MG tablet  Commonly known as:  ALDACTONE            Discharged medication regimen discussed with attending physician prior to discharge.     Discharge Diet:   cardiac diet      Activity at Discharge:  activity as tolerated         Discharge Disposition:            Follow-up Appointments:      Additional Instructions for the Follow-ups that You Need to Schedule     Discharge Follow-up with PCP   As directed       Currently Documented PCP:    Timo Pérez MD    PCP Phone Number:     [phone]     Follow Up Details:  Follow up with Dr. Pérez on 2/6/19 as previously scheduled.         Discharge Follow-up with Specified Provider: Follow up with Dr. Rosado in 2-4 weeks.   As directed      To:  Follow up with Dr. Rosado in 2-4 weeks.           Follow-up Information     Timo Pérez MD Follow up.    Specialty:  Internal Medicine  Why:  Follow up with Dr. Pérez on 2/6/19 as previously scheduled.  Contact information:  [address]  [address]  [place] [address] [phone]             Quan Rosado MD Follow up in 4 week(s).    Specialty:  Cardiology  Why:  PATIENT WILL BE NOTIFIED ABOUT APPT ON TUES 2/5   Contact information:  [address] [place] [address] [phone]                   Additional Instructions for the Follow-ups that You Need to Schedule     Discharge Follow-up with PCP   As directed       Currently Documented PCP:    Timo Pérez MD    PCP Phone Number:    [phone]     Follow Up Details:  Follow up with Dr. Pérez on 2/6/19 as previously scheduled.         Discharge Follow-up with Specified Provider: Follow up with Dr. Rosado in 2-4 weeks.   As directed      To:  Follow up with Dr. Rosado in 2-4 weeks.               Test Results Pending at Discharge:           Marleny Alvarado PA-C  [facility] Medicine Team  02/05/19  7:57 AM      Time: Greater than 30 minutes spent on this discharge.

## 2019-02-04 NOTE — PROGRESS NOTES
LOS: 1 day     Name: Bill Sanford  Age/Sex: 79 y.o. male  :  1940        PCP: Timo Pérez MD    Active Problems:    New onset atrial fibrillation (CMS/HCC)      Admission Information: Bill Sanford is a 79 y.o. male with past medical history significant for no known history of ASCVD, diabetes mellitus, heart failure, or history of TIA/stroke. He does admit history of tobacco abuse but quit 15 years ago. He does have history of essential hypertension and is 79 years of age making his [id number] score 3.      Patient states that he came into the emergency room after going to an urgent care facility who found the patient atrial fibrillation for which she denies any history of this.  He states for the past week or so he has been getting chest pain that he describes as a burning/pressure that he assumed was indigestion. He denies any radiation of this pain and also denies associated symptoms of shortness of breath or diaphoresis. He states that this pain has been constant and has no relation to exertion.      Following for: new onset atrial fibrillation    Telemetry Monitoring: atrial fibrillation in the 90s    Interval history: The patient is resting in bed today. He is very anxious to go home. Stress test results are pending. Echo reveals normal EF with no wall motion abnormality. He denies chest pain and shortness of breath.    Subjective     ROS    Vital Signs  Vitals:    19 1907 19 0309 19 0607 19 1428   BP:  119/69 128/54 119/76   BP Location:   Left arm Left arm   Patient Position:   Sitting Lying   Pulse:  103 105 98   Resp:  18 18 18   Temp:  98.8 °F (37.1 °C) 98.7 °F (37.1 °C)    TempSrc:  Oral Oral    SpO2: 97% 96% 97% 96%   Weight:  93.6 kg (206 lb 6.4 oz)     Height:         Body mass index is 28.8 kg/m².      Intake/Output Summary (Last 24 hours) at 2019 1504  Last data filed at 2/3/2019 1630  Gross per 24 hour   Intake 1400 ml   Output --   Net 1400 ml        Physical Exam   Constitutional: He is oriented to person, place, and time. He appears well-developed and well-nourished.   HENT:   Head: Normocephalic and atraumatic.   Cardiovascular: Normal rate and normal heart sounds. An irregularly irregular rhythm present. Exam reveals no S3 and no S4.   No murmur heard.  Pulmonary/Chest: Effort normal and breath sounds normal. He has no wheezes. He has no rales.   Abdominal: Soft. Bowel sounds are normal.   Musculoskeletal: He exhibits no edema.   Neurological: He is alert and oriented to person, place, and time.   Skin: Skin is warm and dry.   Psychiatric: He has a normal mood and affect. His behavior is normal.              Results Review:     Results from last 7 days   Lab Units 02/04/19  0508 02/03/19  0416 02/02/19  2155   WBC 10*3/mm3 11.40 9.24 9.98   HEMOGLOBIN g/dL 14.0 14.0 14.6   PLATELETS 10*3/mm3 209 175 207     Results from last 7 days   Lab Units 02/04/19  0508 02/03/19  0416 02/02/19  2155   SODIUM mmol/L 139 140 140   POTASSIUM mmol/L 4.2 3.7 4.0   CHLORIDE mmol/L 107 110 108   CO2 mmol/L 23.6* 24.1* 25.5   BUN mg/dL 19 21 21   CREATININE mg/dL 0.99 1.07 1.15   CALCIUM mg/dL 9.3 8.9 9.1   GLUCOSE mg/dL 105 126* 124*     Results from last 7 days   Lab Units 02/03/19  1557 02/03/19  1021 02/03/19  0416 02/03/19  0003 02/02/19  2155   CK TOTAL U/L 16* 16* 23*  --   --    TROPONIN I ng/mL <0.006 <0.006 <0.006 <0.006 <0.006   CKMB ng/mL 0.73 0.77 0.79  --   --    MYOGLOBIN ng/mL 43.0 43.0 44.0  --   --        Results from last 7 days   Lab Units 02/02/19  2155   INR  1.05       I reviewed the patient's new clinical results.  I reviewed the patient's new imaging results and agree with the interpretation.  I personally viewed and interpreted the patient's EKG/Telemetry data      Medication Review:     aspirin 81 mg Oral Daily   atorvastatin 20 mg Oral Daily   enoxaparin 1 mg/kg Subcutaneous Q12H   fluticasone 2 spray Each Nare Daily   metoprolol tartrate 12.5  mg Oral Q12H   multivitamin 1 tablet Oral Daily   pantoprazole 40 mg Oral Q AM   sodium chloride 3 mL Intravenous Q12H       Pharmacy Consult        Assessment:  1. New onset atrial fibrillation [id number] score 3 for age and essential hypertension, rate controlled with metoprolol  2. Essential hypertension, controlled  3. Chest pains, troponin negative x4, nuclear stress test results pending    Recommendations:  1. Will start Eliquis 5 mg PO BID for anticoagulation due to the new onset atrial fibrillation with CHADSVASc score of 3  2. Continue metoprolol for rate control  3. Nuclear stress test results pending.     I discussed the patients findings and my recommendations with patient and family      NOA Murdock, acting as scribe for Dr. Quan Rosado MD, FACC.  02/04/19  3:04 PM    I, Quan Rosado MD, FACC, personally performed the services described in this documentation as scribed by the above named individual in my presence, and it is both accurate and complete.       Quan Rosado MD, FACC  02/04/19  3:04 PM

## 2019-02-05 ENCOUNTER — TELEPHONE (OUTPATIENT)
Dept: TELEMETRY | Facility: HOSPITAL | Age: 79
End: 2019-02-05

## 2019-02-05 NOTE — PAYOR COMM NOTE
"[facility]  NPI:[id number]    Utilization Review  Contact: Aleida Aguilar RN  Phone: [phone]  Fax:[phone]    DISCHARGE NOTIFICATION  DISCHARGE TO HOME ON 02/04/2019       Bill Thompson (79 y.o. Male)     Date of Birth Social Security Number Address Home Phone MRN    1940  [address]  [place] [address] [phone] [id number]    [employer] Marital Status          None        Admission Date Admission Type Admitting Provider Attending Provider Department, Room/Bed    2/3/19 Emergency Montserrat Moreno DO  [facility] 3 [facility], 3318/1P    Discharge Date Discharge Disposition Discharge Destination        2/4/2019 Home or Self Care              Attending Provider:  (none)   Allergies:  No Known Allergies    Isolation:  None   Infection:  None   Code Status:  Prior    Ht:  180.3 cm (70.98\")   Wt:  93.6 kg (206 lb 6.4 oz)    Admission Cmt:  None   Principal Problem:  None                Active Insurance as of 2/2/2019     Primary Coverage     Payor Plan Insurance Group Employer/Plan Group    ANTH MEDICARE REPLACEMENT ANTHEM MEDICARE ADVANTAGE [id number]     Payor Plan Address Payor Plan Phone Number Payor Plan Fax Number Effective Dates    [address] [phone]  1/1/2019 - None Entered    [facility] [id number]       Subscriber Name Subscriber Birth Date Member ID       BILL THOMPSON 1940 [id number]                 Emergency Contacts      (Rel.) Home Phone Work Phone Mobile Phone    ALBINO THOMPSON (Spouse) [phone] -- --            Discharge Summary     No notes of this type exist for this encounter.        "

## 2019-08-15 ENCOUNTER — HOSPITAL ENCOUNTER (EMERGENCY)
Facility: HOSPITAL | Age: 79
Discharge: HOME | End: 2019-08-15
Attending: EMERGENCY MEDICINE | Admitting: EMERGENCY MEDICINE

## 2019-08-15 ENCOUNTER — APPOINTMENT (OUTPATIENT)
Dept: GENERAL RADIOLOGY | Facility: HOSPITAL | Age: 79
End: 2019-08-15

## 2019-08-15 VITALS
HEIGHT: 71 IN | RESPIRATION RATE: 16 BRPM | HEART RATE: 62 BPM | TEMPERATURE: 97.7 F | WEIGHT: 201 LBS | DIASTOLIC BLOOD PRESSURE: 89 MMHG | OXYGEN SATURATION: 97 % | SYSTOLIC BLOOD PRESSURE: 154 MMHG | BODY MASS INDEX: 28.14 KG/M2

## 2019-08-15 DIAGNOSIS — I48.20 ATRIAL FIBRILLATION, CHRONIC (HCC): Primary | ICD-10-CM

## 2019-08-15 LAB
ALBUMIN SERPL-MCNC: 4.08 G/DL (ref 3.5–5.2)
ALBUMIN/GLOB SERPL: 1.4 G/DL
ALP SERPL-CCNC: 195 U/L (ref 39–117)
ALT SERPL W P-5'-P-CCNC: 24 U/L (ref 1–41)
ANION GAP SERPL CALCULATED.3IONS-SCNC: 14.5 MMOL/L (ref 5–15)
AST SERPL-CCNC: 24 U/L (ref 1–40)
BASOPHILS # BLD AUTO: 0.04 10*3/MM3 (ref 0–0.2)
BASOPHILS NFR BLD AUTO: 0.7 % (ref 0–1.5)
BILIRUB SERPL-MCNC: 1.1 MG/DL (ref 0.2–1.2)
BUN BLD-MCNC: 19 MG/DL (ref 8–23)
BUN/CREAT SERPL: 16.7 (ref 7–25)
CALCIUM SPEC-SCNC: 9.2 MG/DL (ref 8.6–10.5)
CHLORIDE SERPL-SCNC: 102 MMOL/L (ref 98–107)
CO2 SERPL-SCNC: 23.5 MMOL/L (ref 22–29)
CREAT BLD-MCNC: 1.14 MG/DL (ref 0.76–1.27)
DEPRECATED RDW RBC AUTO: 47.3 FL (ref 37–54)
EOSINOPHIL # BLD AUTO: 0.22 10*3/MM3 (ref 0–0.4)
EOSINOPHIL NFR BLD AUTO: 3.7 % (ref 0.3–6.2)
ERYTHROCYTE [DISTWIDTH] IN BLOOD BY AUTOMATED COUNT: 14.6 % (ref 12.3–15.4)
GFR SERPL CREATININE-BSD FRML MDRD: 62 ML/MIN/1.73
GLOBULIN UR ELPH-MCNC: 2.9 GM/DL
GLUCOSE BLD-MCNC: 141 MG/DL (ref 65–99)
HCT VFR BLD AUTO: 47.3 % (ref 37.5–51)
HGB BLD-MCNC: 15.3 G/DL (ref 13–17.7)
IMM GRANULOCYTES # BLD AUTO: 0.02 10*3/MM3 (ref 0–0.05)
IMM GRANULOCYTES NFR BLD AUTO: 0.3 % (ref 0–0.5)
LYMPHOCYTES # BLD AUTO: 1.42 10*3/MM3 (ref 0.7–3.1)
LYMPHOCYTES NFR BLD AUTO: 24.1 % (ref 19.6–45.3)
MCH RBC QN AUTO: 30 PG (ref 26.6–33)
MCHC RBC AUTO-ENTMCNC: 32.3 G/DL (ref 31.5–35.7)
MCV RBC AUTO: 92.7 FL (ref 79–97)
MONOCYTES # BLD AUTO: 0.99 10*3/MM3 (ref 0.1–0.9)
MONOCYTES NFR BLD AUTO: 16.8 % (ref 5–12)
NEUTROPHILS # BLD AUTO: 3.19 10*3/MM3 (ref 1.7–7)
NEUTROPHILS NFR BLD AUTO: 54.4 % (ref 42.7–76)
PLATELET # BLD AUTO: 183 10*3/MM3 (ref 140–450)
PMV BLD AUTO: 10.2 FL (ref 6–12)
POTASSIUM BLD-SCNC: 4.5 MMOL/L (ref 3.5–5.2)
PROT SERPL-MCNC: 7 G/DL (ref 6–8.5)
RBC # BLD AUTO: 5.1 10*6/MM3 (ref 4.14–5.8)
SODIUM BLD-SCNC: 140 MMOL/L (ref 136–145)
TROPONIN T SERPL-MCNC: <0.01 NG/ML (ref 0–0.03)
WBC NRBC COR # BLD: 5.88 10*3/MM3 (ref 3.4–10.8)

## 2019-08-15 PROCEDURE — 85025 COMPLETE CBC W/AUTO DIFF WBC: CPT | Performed by: EMERGENCY MEDICINE

## 2019-08-15 PROCEDURE — 36415 COLL VENOUS BLD VENIPUNCTURE: CPT

## 2019-08-15 PROCEDURE — 93005 ELECTROCARDIOGRAM TRACING: CPT | Performed by: EMERGENCY MEDICINE

## 2019-08-15 PROCEDURE — 71046 X-RAY EXAM CHEST 2 VIEWS: CPT | Performed by: RADIOLOGY

## 2019-08-15 PROCEDURE — 71046 X-RAY EXAM CHEST 2 VIEWS: CPT

## 2019-08-15 PROCEDURE — 93010 ELECTROCARDIOGRAM REPORT: CPT | Performed by: INTERNAL MEDICINE

## 2019-08-15 PROCEDURE — 99282 EMERGENCY DEPT VISIT SF MDM: CPT

## 2019-08-15 PROCEDURE — 80053 COMPREHEN METABOLIC PANEL: CPT | Performed by: EMERGENCY MEDICINE

## 2019-08-15 PROCEDURE — 84484 ASSAY OF TROPONIN QUANT: CPT | Performed by: EMERGENCY MEDICINE

## 2019-08-16 NOTE — ED NOTES
"Upon entering pt's room, pt appears agitated. Asked pt what brings him to the ER. Pt states \"well I went to [facility] and they sent me here for some reason. All I need is blood work for my doctors appt tmrw. They said my EKG was abnormal and made me come here. I am not staying here all night so do my blood work and let me go.\" Pt denies being short of breath at this time. EKG shows A-fib with a HR of 63 bpm, pt does have a history of A-fib. Pt refuses vital signs, monitor, and being put in a gown at this time. MD and lead nurse made aware. Will continue to monitor pt.      Jessica Chavez RN  08/15/19 2052    "

## 2019-08-24 NOTE — ED PROVIDER NOTES
Subjective   Patient presents to ER with shortness of breath          Shortness of Breath   Severity:  Mild  Onset quality:  Gradual  Chronicity:  Recurrent  Relieved by:  Nothing  Worsened by:  Nothing  Ineffective treatments:  None tried      Review of Systems   Constitutional: Positive for activity change and fatigue.   HENT: Negative.    Eyes: Negative.    Respiratory: Positive for shortness of breath.    Cardiovascular: Negative.    Gastrointestinal: Negative.    Endocrine: Negative.    Genitourinary: Negative.    Musculoskeletal: Negative.    Allergic/Immunologic: Negative.    Neurological: Negative.    Hematological: Negative.    Psychiatric/Behavioral: Negative.        Past Medical History:   Diagnosis Date   • Hypertension        No Known Allergies    No past surgical history on file.    No family history on file.    Social History     Socioeconomic History   • Marital status:      Spouse name: Not on file   • Number of children: Not on file   • Years of education: Not on file   • Highest education level: Not on file   Tobacco Use   • Smoking status: Never Smoker   • Smokeless tobacco: Never Used   Substance and Sexual Activity   • Alcohol use: No     Frequency: Never   • Drug use: No   • Sexual activity: Defer           Objective   Physical Exam   Constitutional: He appears well-developed and well-nourished.   HENT:   Head: Normocephalic and atraumatic.   Mouth/Throat: Oropharynx is clear and moist.   Eyes: EOM are normal. Pupils are equal, round, and reactive to light.   Neck: Normal range of motion.   Cardiovascular: Normal rate.   Irregularly irregular   Pulmonary/Chest: Effort normal. He has wheezes in the right upper field.   Musculoskeletal: Normal range of motion.        Right lower leg: Normal.        Left lower leg: Normal.   Neurological: He is alert.   Skin: Skin is warm. Capillary refill takes less than 2 seconds.   Psychiatric: He has a normal mood and affect.   Nursing note and vitals  reviewed.      Procedures           ED Course                  MDM      Final diagnoses:   Atrial fibrillation, chronic (CMS/HCC)            Riley Price MD  08/24/19 1814       Riley Price MD  08/24/19 1814

## 2019-08-29 ENCOUNTER — TRANSCRIBE ORDERS (OUTPATIENT)
Dept: ADMINISTRATIVE | Facility: HOSPITAL | Age: 79
End: 2019-08-29

## 2019-08-29 DIAGNOSIS — R06.00 DYSPNEA, UNSPECIFIED TYPE: Primary | ICD-10-CM

## 2019-09-19 ENCOUNTER — HOSPITAL ENCOUNTER (OUTPATIENT)
Dept: RESPIRATORY THERAPY | Facility: HOSPITAL | Age: 79
Discharge: HOME | End: 2019-09-19
Admitting: INTERNAL MEDICINE

## 2019-09-19 DIAGNOSIS — R06.00 DYSPNEA, UNSPECIFIED TYPE: ICD-10-CM

## 2019-09-19 PROCEDURE — 94010 BREATHING CAPACITY TEST: CPT | Performed by: INTERNAL MEDICINE

## 2019-09-19 PROCEDURE — 94010 BREATHING CAPACITY TEST: CPT

## 2019-09-19 PROCEDURE — 94729 DIFFUSING CAPACITY: CPT

## 2019-09-19 PROCEDURE — 94729 DIFFUSING CAPACITY: CPT | Performed by: INTERNAL MEDICINE

## 2019-09-19 PROCEDURE — 94726 PLETHYSMOGRAPHY LUNG VOLUMES: CPT | Performed by: INTERNAL MEDICINE

## 2019-09-19 PROCEDURE — 94726 PLETHYSMOGRAPHY LUNG VOLUMES: CPT

## 2020-02-10 ENCOUNTER — APPOINTMENT (OUTPATIENT)
Dept: GENERAL RADIOLOGY | Facility: HOSPITAL | Age: 80
End: 2020-02-10

## 2020-02-10 ENCOUNTER — HOSPITAL ENCOUNTER (EMERGENCY)
Facility: HOSPITAL | Age: 80
Discharge: ANOTHER HOSPITAL | End: 2020-02-10
Attending: EMERGENCY MEDICINE | Admitting: EMERGENCY MEDICINE

## 2020-02-10 ENCOUNTER — APPOINTMENT (OUTPATIENT)
Dept: CT IMAGING | Facility: HOSPITAL | Age: 80
End: 2020-02-10

## 2020-02-10 VITALS
TEMPERATURE: 97.7 F | OXYGEN SATURATION: 97 % | HEART RATE: 71 BPM | RESPIRATION RATE: 20 BRPM | DIASTOLIC BLOOD PRESSURE: 79 MMHG | SYSTOLIC BLOOD PRESSURE: 164 MMHG | WEIGHT: 205 LBS | BODY MASS INDEX: 28.7 KG/M2 | HEIGHT: 71 IN

## 2020-02-10 DIAGNOSIS — I61.9 INTRAPARENCHYMAL HEMORRHAGE OF BRAIN (HCC): Primary | ICD-10-CM

## 2020-02-10 DIAGNOSIS — I10 UNCONTROLLED HYPERTENSION: ICD-10-CM

## 2020-02-10 LAB
A-A DO2: 18.9 MMHG (ref 0–300)
ALBUMIN SERPL-MCNC: 4.37 G/DL (ref 3.5–5.2)
ALBUMIN/GLOB SERPL: 1.4 G/DL
ALP SERPL-CCNC: 110 U/L (ref 39–117)
ALT SERPL W P-5'-P-CCNC: 21 U/L (ref 1–41)
ANION GAP SERPL CALCULATED.3IONS-SCNC: 10.7 MMOL/L (ref 5–15)
ARTERIAL PATENCY WRIST A: POSITIVE
AST SERPL-CCNC: 21 U/L (ref 1–40)
ATMOSPHERIC PRESS: 727 MMHG
BASE EXCESS BLDA CALC-SCNC: 1.2 MMOL/L (ref 0–2)
BASOPHILS # BLD AUTO: 0.02 10*3/MM3 (ref 0–0.2)
BASOPHILS NFR BLD AUTO: 0.3 % (ref 0–1.5)
BDY SITE: ABNORMAL
BILIRUB SERPL-MCNC: 1 MG/DL (ref 0.2–1.2)
BODY TEMPERATURE: 0 C
BUN BLD-MCNC: 17 MG/DL (ref 8–23)
BUN/CREAT SERPL: 14.5 (ref 7–25)
CALCIUM SPEC-SCNC: 9.6 MG/DL (ref 8.6–10.5)
CHLORIDE SERPL-SCNC: 103 MMOL/L (ref 98–107)
CO2 BLDA-SCNC: 27.3 MMOL/L (ref 22–33)
CO2 SERPL-SCNC: 26.3 MMOL/L (ref 22–29)
COHGB MFR BLD: 0.7 % (ref 0–5)
CREAT BLD-MCNC: 1.17 MG/DL (ref 0.76–1.27)
DEPRECATED RDW RBC AUTO: 44.6 FL (ref 37–54)
EOSINOPHIL # BLD AUTO: 0.14 10*3/MM3 (ref 0–0.4)
EOSINOPHIL NFR BLD AUTO: 2.1 % (ref 0.3–6.2)
ERYTHROCYTE [DISTWIDTH] IN BLOOD BY AUTOMATED COUNT: 13 % (ref 12.3–15.4)
GFR SERPL CREATININE-BSD FRML MDRD: 60 ML/MIN/1.73
GLOBULIN UR ELPH-MCNC: 3.1 GM/DL
GLUCOSE BLD-MCNC: 84 MG/DL (ref 65–99)
HCO3 BLDA-SCNC: 26 MMOL/L (ref 20–26)
HCT VFR BLD AUTO: 46.2 % (ref 37.5–51)
HCT VFR BLD CALC: 47.8 % (ref 38–51)
HGB BLD-MCNC: 15.4 G/DL (ref 13–17.7)
HGB BLDA-MCNC: 15.6 G/DL (ref 14–18)
HOROWITZ INDEX BLD+IHG-RTO: 21 %
IMM GRANULOCYTES # BLD AUTO: 0.01 10*3/MM3 (ref 0–0.05)
IMM GRANULOCYTES NFR BLD AUTO: 0.2 % (ref 0–0.5)
LYMPHOCYTES # BLD AUTO: 1.81 10*3/MM3 (ref 0.7–3.1)
LYMPHOCYTES NFR BLD AUTO: 27.7 % (ref 19.6–45.3)
Lab: ABNORMAL
MCH RBC QN AUTO: 31.3 PG (ref 26.6–33)
MCHC RBC AUTO-ENTMCNC: 33.3 G/DL (ref 31.5–35.7)
MCV RBC AUTO: 93.9 FL (ref 79–97)
METHGB BLD QL: 0.3 % (ref 0–3)
MODALITY: ABNORMAL
MONOCYTES # BLD AUTO: 0.89 10*3/MM3 (ref 0.1–0.9)
MONOCYTES NFR BLD AUTO: 13.6 % (ref 5–12)
NEUTROPHILS # BLD AUTO: 3.66 10*3/MM3 (ref 1.7–7)
NEUTROPHILS NFR BLD AUTO: 56.1 % (ref 42.7–76)
NOTE: ABNORMAL
NRBC BLD AUTO-RTO: 0 /100 WBC (ref 0–0.2)
NT-PROBNP SERPL-MCNC: 227.2 PG/ML (ref 5–1800)
OXYHGB MFR BLDV: 95.3 % (ref 94–99)
PCO2 BLDA: 41.1 MM HG (ref 35–45)
PCO2 TEMP ADJ BLD: ABNORMAL MM[HG]
PH BLDA: 7.41 PH UNITS (ref 7.35–7.45)
PH, TEMP CORRECTED: ABNORMAL
PLATELET # BLD AUTO: 185 10*3/MM3 (ref 140–450)
PMV BLD AUTO: 9.9 FL (ref 6–12)
PO2 BLDA: 77.6 MM HG (ref 83–108)
PO2 TEMP ADJ BLD: ABNORMAL MM[HG]
POTASSIUM BLD-SCNC: 4.1 MMOL/L (ref 3.5–5.2)
PROT SERPL-MCNC: 7.5 G/DL (ref 6–8.5)
RBC # BLD AUTO: 4.92 10*6/MM3 (ref 4.14–5.8)
SAO2 % BLDCOA: 96.3 % (ref 94–99)
SODIUM BLD-SCNC: 140 MMOL/L (ref 136–145)
TROPONIN T SERPL-MCNC: <0.01 NG/ML (ref 0–0.03)
TROPONIN T SERPL-MCNC: <0.01 NG/ML (ref 0–0.03)
VENTILATOR MODE: ABNORMAL
WBC NRBC COR # BLD: 6.53 10*3/MM3 (ref 3.4–10.8)

## 2020-02-10 PROCEDURE — 71045 X-RAY EXAM CHEST 1 VIEW: CPT

## 2020-02-10 PROCEDURE — 85025 COMPLETE CBC W/AUTO DIFF WBC: CPT | Performed by: EMERGENCY MEDICINE

## 2020-02-10 PROCEDURE — 70450 CT HEAD/BRAIN W/O DYE: CPT

## 2020-02-10 PROCEDURE — 83050 HGB METHEMOGLOBIN QUAN: CPT

## 2020-02-10 PROCEDURE — 99285 EMERGENCY DEPT VISIT HI MDM: CPT

## 2020-02-10 PROCEDURE — 82805 BLOOD GASES W/O2 SATURATION: CPT

## 2020-02-10 PROCEDURE — 70450 CT HEAD/BRAIN W/O DYE: CPT | Performed by: RADIOLOGY

## 2020-02-10 PROCEDURE — 93005 ELECTROCARDIOGRAM TRACING: CPT | Performed by: EMERGENCY MEDICINE

## 2020-02-10 PROCEDURE — 96365 THER/PROPH/DIAG IV INF INIT: CPT

## 2020-02-10 PROCEDURE — 84484 ASSAY OF TROPONIN QUANT: CPT | Performed by: EMERGENCY MEDICINE

## 2020-02-10 PROCEDURE — 83880 ASSAY OF NATRIURETIC PEPTIDE: CPT | Performed by: EMERGENCY MEDICINE

## 2020-02-10 PROCEDURE — 71045 X-RAY EXAM CHEST 1 VIEW: CPT | Performed by: RADIOLOGY

## 2020-02-10 PROCEDURE — 96366 THER/PROPH/DIAG IV INF ADDON: CPT

## 2020-02-10 PROCEDURE — 36600 WITHDRAWAL OF ARTERIAL BLOOD: CPT

## 2020-02-10 PROCEDURE — 80053 COMPREHEN METABOLIC PANEL: CPT | Performed by: EMERGENCY MEDICINE

## 2020-02-10 PROCEDURE — 93010 ELECTROCARDIOGRAM REPORT: CPT | Performed by: INTERNAL MEDICINE

## 2020-02-10 PROCEDURE — 82375 ASSAY CARBOXYHB QUANT: CPT

## 2020-02-10 RX ORDER — LISINOPRIL 5 MG/1
10 TABLET ORAL DAILY
COMMUNITY

## 2020-02-10 RX ORDER — SODIUM CHLORIDE 0.9 % (FLUSH) 0.9 %
10 SYRINGE (ML) INJECTION AS NEEDED
Status: DISCONTINUED | OUTPATIENT
Start: 2020-02-10 | End: 2020-02-10 | Stop reason: HOSPADM

## 2020-02-10 RX ADMIN — SODIUM CHLORIDE 2.5 MG/HR: 9 INJECTION, SOLUTION INTRAVENOUS at 16:15

## 2020-02-10 NOTE — ED PROVIDER NOTES
"Subjective   80-year-old white male complains of hypertension.  History is per patient and wife.  Earlier today the patient's blood pressure was elevated with a systolic greater than 200.  She noticed that he had confusion, slurred speech, and was \"not making sense\".  He takes his blood pressure frequently with wrist monitor.  Recently his metoprolol was stopped due to bradycardia, and he was started on lisinopril.          Review of Systems   All other systems reviewed and are negative.      Past Medical History:   Diagnosis Date   • A-fib (CMS/[facility])    • Hypertension        No Known Allergies    History reviewed. No pertinent surgical history.    History reviewed. No pertinent family history.    Social History     Socioeconomic History   • Marital status:      Spouse name: Not on file   • Number of children: Not on file   • Years of education: Not on file   • Highest education level: Not on file   Tobacco Use   • Smoking status: Never Smoker   • Smokeless tobacco: Never Used   Substance and Sexual Activity   • Alcohol use: No     Frequency: Never   • Drug use: No   • Sexual activity: Defer           Objective   Physical Exam   Constitutional: He is oriented to person, place, and time. He appears well-developed and well-nourished.   HENT:   Head: Normocephalic and atraumatic.   Mouth/Throat: Oropharynx is clear and moist.   Eyes: Pupils are equal, round, and reactive to light. Conjunctivae and EOM are normal.   Neck: Normal range of motion.   Cardiovascular: Normal rate, regular rhythm and normal heart sounds. Exam reveals no gallop and no friction rub.   No murmur heard.  Pulmonary/Chest: Effort normal and breath sounds normal. No respiratory distress. He has no wheezes. He has no rales.   Abdominal: Soft. Bowel sounds are normal. He exhibits no distension. There is no tenderness.   Musculoskeletal: Normal range of motion. He exhibits no edema.   Neurological: He is alert and oriented to person, place, and " time. He has normal strength. No cranial nerve deficit or sensory deficit. GCS eye subscore is 4. GCS verbal subscore is 5. GCS motor subscore is 6.   Mildly confused   Skin: Skin is warm and dry.   Psychiatric: He has a normal mood and affect.   Nursing note and vitals reviewed.      Procedures  Results for orders placed or performed during the hospital encounter of 02/10/20   Comprehensive Metabolic Panel   Result Value Ref Range    Glucose 84 65 - 99 mg/dL    BUN 17 8 - 23 mg/dL    Creatinine 1.17 0.76 - 1.27 mg/dL    Sodium 140 136 - 145 mmol/L    Potassium 4.1 3.5 - 5.2 mmol/L    Chloride 103 98 - 107 mmol/L    CO2 26.3 22.0 - 29.0 mmol/L    Calcium 9.6 8.6 - 10.5 mg/dL    Total Protein 7.5 6.0 - 8.5 g/dL    Albumin 4.37 3.50 - 5.20 g/dL    ALT (SGPT) 21 1 - 41 U/L    AST (SGOT) 21 1 - 40 U/L    Alkaline Phosphatase 110 39 - 117 U/L    Total Bilirubin 1.0 0.2 - 1.2 mg/dL    eGFR Non African Amer 60 (L) >60 mL/min/1.73    Globulin 3.1 gm/dL    A/G Ratio 1.4 g/dL    BUN/Creatinine Ratio 14.5 7.0 - 25.0    Anion Gap 10.7 5.0 - 15.0 mmol/L   Troponin   Result Value Ref Range    Troponin T <0.010 0.000 - 0.030 ng/mL   Troponin   Result Value Ref Range    Troponin T <0.010 0.000 - 0.030 ng/mL   BNP   Result Value Ref Range    proBNP 227.2 5.0-1,800.0 pg/mL   CBC Auto Differential   Result Value Ref Range    WBC 6.53 3.40 - 10.80 10*3/mm3    RBC 4.92 4.14 - 5.80 10*6/mm3    Hemoglobin 15.4 13.0 - 17.7 g/dL    Hematocrit 46.2 37.5 - 51.0 %    MCV 93.9 79.0 - 97.0 fL    MCH 31.3 26.6 - 33.0 pg    MCHC 33.3 31.5 - 35.7 g/dL    RDW 13.0 12.3 - 15.4 %    RDW-SD 44.6 37.0 - 54.0 fl    MPV 9.9 6.0 - 12.0 fL    Platelets 185 140 - 450 10*3/mm3    Neutrophil % 56.1 42.7 - 76.0 %    Lymphocyte % 27.7 19.6 - 45.3 %    Monocyte % 13.6 (H) 5.0 - 12.0 %    Eosinophil % 2.1 0.3 - 6.2 %    Basophil % 0.3 0.0 - 1.5 %    Immature Grans % 0.2 0.0 - 0.5 %    Neutrophils, Absolute 3.66 1.70 - 7.00 10*3/mm3    Lymphocytes, Absolute 1.81  0.70 - 3.10 10*3/mm3    Monocytes, Absolute 0.89 0.10 - 0.90 10*3/mm3    Eosinophils, Absolute 0.14 0.00 - 0.40 10*3/mm3    Basophils, Absolute 0.02 0.00 - 0.20 10*3/mm3    Immature Grans, Absolute 0.01 0.00 - 0.05 10*3/mm3    nRBC 0.0 0.0 - 0.2 /100 WBC   Blood Gas, Arterial With Co-Ox   Result Value Ref Range    Site Right Radial     Yaron's Test Positive     pH, Arterial 7.411 7.350 - 7.450 pH units    pCO2, Arterial 41.1 35.0 - 45.0 mm Hg    pO2, Arterial 77.6 (L) 83.0 - 108.0 mm Hg    HCO3, Arterial 26.0 20.0 - 26.0 mmol/L    Base Excess, Arterial 1.2 0.0 - 2.0 mmol/L    O2 Saturation, Arterial 96.3 94.0 - 99.0 %    Hemoglobin, Blood Gas 15.6 14 - 18 g/dL    Hematocrit, Blood Gas 47.8 38.0 - 51.0 %    Oxyhemoglobin 95.3 94 - 99 %    Methemoglobin 0.30 0.00 - 3.00 %    Carboxyhemoglobin 0.7 0 - 5 %    A-a Gradiant 18.9 0.0 - 300.0 mmHg    CO2 Content 27.3 22 - 33 mmol/L    Temperature 0.0 C    Barometric Pressure for Blood Gas 727 mmHg    Modality Room Air     FIO2 21 %    Ventilator Mode NA     Note      Collected by [id number]     pH, Temp Corrected      pCO2, Temperature Corrected      pO2, Temperature Corrected       Xr Chest 1 View    Result Date: 2/10/2020  Narrative: XR CHEST 1 VW-  CLINICAL INDICATION: sob   COMPARISON: 08/15/2019  TECHNIQUE: Single frontal view of the chest.  FINDINGS:  There is no focal alveolar infiltrate or effusion. The cardiac silhouette is normal. The pulmonary vasculature is unremarkable. There is no evidence of an acute osseous abnormality. There are no suspicious-appearing parenchymal soft tissue nodules.       Impression: No evidence of active or acute cardiopulmonary disease on today's chest radiograph.  This report was finalized on 2/10/2020 3:45 PM by Dr. Stephen Mckeon MD.      Ct Head Without Contrast Stroke Protocol    Result Date: 2/10/2020  Narrative: CT HEAD WO CONTRAST STROKE PROTOCOL-  REASON FOR EXAM: slurred speech, confusion, HTN  TECHNIQUE: Spiral scans were obtained  through the brain in the axial plane. No previous studies are available for comparison.  FINDINGS: Today's study shows the patient has an acute intraparenchymal hemorrhage in the posterior parietal occipital junction region. This measures 15 mm in diameter. There is no significant edema or mass effect at this time. There is no evidence of intraventricular hemorrhage, subdural or epidural hematoma.      Impression: A 15 mm intraparenchymal hemorrhage in the left posterior parietal area.   The radiation dose reduction device was utilized for each scan per the ALARA (as low as reasonably achievable) protocol.               ED Course  ED Course as of Feb 10 1916   Mon Feb 10, 2020   1602 Discussed results with patient and family.  He does have a small intraparenchymal left parietal bleed.  Have discussed with the neurosurgery  at  who agrees to accept the patient in transfer under Dr. Gamez.    [BC]      ED Course User Index  [BC] Ed Mandel MD                                             ICH Score (for Intracerebral Hemorrhage) reviewed and/or performed as part of the patient evaluation and treatment planning process.  The result associated with this review/performance is: 0       MDM  Number of Diagnoses or Management Options     Amount and/or Complexity of Data Reviewed  Clinical lab tests: reviewed  Tests in the radiology section of CPT®: reviewed  Tests in the medicine section of CPT®: reviewed    Risk of Complications, Morbidity, and/or Mortality  Presenting problems: high  Diagnostic procedures: high  Management options: high        Final diagnoses:   Intraparenchymal hemorrhage of brain (CMS/HCC)   Uncontrolled hypertension            Ed Mandel MD  02/10/20 1916

## 2020-02-10 NOTE — ED NOTES
Calling [facility] to get a  transfer to [facility] is going  call me back             Fore, Ludwig  02/10/20 8751

## 2020-02-10 NOTE — ED NOTES
Called [facility] for dr corbin they are on the phone at this time      Ludwig Hardin  02/10/20 7626

## 2020-02-18 PROCEDURE — 99284 EMERGENCY DEPT VISIT MOD MDM: CPT

## 2020-02-19 ENCOUNTER — HOSPITAL ENCOUNTER (EMERGENCY)
Facility: HOSPITAL | Age: 80
Discharge: HOME | End: 2020-02-19
Attending: EMERGENCY MEDICINE | Admitting: EMERGENCY MEDICINE

## 2020-02-19 VITALS
HEART RATE: 69 BPM | RESPIRATION RATE: 18 BRPM | HEIGHT: 71 IN | WEIGHT: 204 LBS | TEMPERATURE: 98.4 F | BODY MASS INDEX: 28.56 KG/M2 | DIASTOLIC BLOOD PRESSURE: 80 MMHG | OXYGEN SATURATION: 100 % | SYSTOLIC BLOOD PRESSURE: 152 MMHG

## 2020-02-19 DIAGNOSIS — I10 ESSENTIAL HYPERTENSION: Primary | ICD-10-CM

## 2020-02-19 LAB
ALBUMIN SERPL-MCNC: 4.12 G/DL (ref 3.5–5.2)
ALBUMIN/GLOB SERPL: 1.6 G/DL
ALP SERPL-CCNC: 100 U/L (ref 39–117)
ALT SERPL W P-5'-P-CCNC: 20 U/L (ref 1–41)
ANION GAP SERPL CALCULATED.3IONS-SCNC: 11.8 MMOL/L (ref 5–15)
AST SERPL-CCNC: 23 U/L (ref 1–40)
BASOPHILS # BLD AUTO: 0.04 10*3/MM3 (ref 0–0.2)
BASOPHILS NFR BLD AUTO: 0.5 % (ref 0–1.5)
BILIRUB SERPL-MCNC: 0.8 MG/DL (ref 0.2–1.2)
BILIRUB UR QL STRIP: NEGATIVE
BUN BLD-MCNC: 18 MG/DL (ref 8–23)
BUN/CREAT SERPL: 14.4 (ref 7–25)
CALCIUM SPEC-SCNC: 9.3 MG/DL (ref 8.6–10.5)
CHLORIDE SERPL-SCNC: 102 MMOL/L (ref 98–107)
CLARITY UR: CLEAR
CO2 SERPL-SCNC: 23.2 MMOL/L (ref 22–29)
COLOR UR: YELLOW
CREAT BLD-MCNC: 1.25 MG/DL (ref 0.76–1.27)
DEPRECATED RDW RBC AUTO: 43.7 FL (ref 37–54)
EOSINOPHIL # BLD AUTO: 0.13 10*3/MM3 (ref 0–0.4)
EOSINOPHIL NFR BLD AUTO: 1.7 % (ref 0.3–6.2)
ERYTHROCYTE [DISTWIDTH] IN BLOOD BY AUTOMATED COUNT: 12.5 % (ref 12.3–15.4)
GFR SERPL CREATININE-BSD FRML MDRD: 56 ML/MIN/1.73
GLOBULIN UR ELPH-MCNC: 2.6 GM/DL
GLUCOSE BLD-MCNC: 132 MG/DL (ref 65–99)
GLUCOSE UR STRIP-MCNC: NEGATIVE MG/DL
HCT VFR BLD AUTO: 44 % (ref 37.5–51)
HGB BLD-MCNC: 14.7 G/DL (ref 13–17.7)
HGB UR QL STRIP.AUTO: NEGATIVE
IMM GRANULOCYTES # BLD AUTO: 0.03 10*3/MM3 (ref 0–0.05)
IMM GRANULOCYTES NFR BLD AUTO: 0.4 % (ref 0–0.5)
KETONES UR QL STRIP: NEGATIVE
LEUKOCYTE ESTERASE UR QL STRIP.AUTO: NEGATIVE
LYMPHOCYTES # BLD AUTO: 1.17 10*3/MM3 (ref 0.7–3.1)
LYMPHOCYTES NFR BLD AUTO: 14.9 % (ref 19.6–45.3)
MCH RBC QN AUTO: 31.8 PG (ref 26.6–33)
MCHC RBC AUTO-ENTMCNC: 33.4 G/DL (ref 31.5–35.7)
MCV RBC AUTO: 95.2 FL (ref 79–97)
MONOCYTES # BLD AUTO: 0.89 10*3/MM3 (ref 0.1–0.9)
MONOCYTES NFR BLD AUTO: 11.4 % (ref 5–12)
NEUTROPHILS # BLD AUTO: 5.57 10*3/MM3 (ref 1.7–7)
NEUTROPHILS NFR BLD AUTO: 71.1 % (ref 42.7–76)
NITRITE UR QL STRIP: NEGATIVE
NRBC BLD AUTO-RTO: 0 /100 WBC (ref 0–0.2)
PH UR STRIP.AUTO: 6.5 [PH] (ref 5–8)
PLATELET # BLD AUTO: 191 10*3/MM3 (ref 140–450)
PMV BLD AUTO: 10.1 FL (ref 6–12)
POTASSIUM BLD-SCNC: 4.4 MMOL/L (ref 3.5–5.2)
PROT SERPL-MCNC: 6.7 G/DL (ref 6–8.5)
PROT UR QL STRIP: NEGATIVE
RBC # BLD AUTO: 4.62 10*6/MM3 (ref 4.14–5.8)
SODIUM BLD-SCNC: 137 MMOL/L (ref 136–145)
SP GR UR STRIP: 1.01 (ref 1–1.03)
T4 FREE SERPL-MCNC: 1.5 NG/DL (ref 0.93–1.7)
TROPONIN T SERPL-MCNC: <0.01 NG/ML (ref 0–0.03)
TROPONIN T SERPL-MCNC: <0.01 NG/ML (ref 0–0.03)
TSH SERPL DL<=0.05 MIU/L-ACNC: 5.45 UIU/ML (ref 0.27–4.2)
UROBILINOGEN UR QL STRIP: NORMAL
WBC NRBC COR # BLD: 7.83 10*3/MM3 (ref 3.4–10.8)

## 2020-02-19 PROCEDURE — 93010 ELECTROCARDIOGRAM REPORT: CPT | Performed by: INTERNAL MEDICINE

## 2020-02-19 PROCEDURE — 84443 ASSAY THYROID STIM HORMONE: CPT | Performed by: PHYSICIAN ASSISTANT

## 2020-02-19 PROCEDURE — 25010000002 HYDRALAZINE PER 20 MG: Performed by: PHYSICIAN ASSISTANT

## 2020-02-19 PROCEDURE — 96374 THER/PROPH/DIAG INJ IV PUSH: CPT

## 2020-02-19 PROCEDURE — 80053 COMPREHEN METABOLIC PANEL: CPT | Performed by: PHYSICIAN ASSISTANT

## 2020-02-19 PROCEDURE — 36415 COLL VENOUS BLD VENIPUNCTURE: CPT

## 2020-02-19 PROCEDURE — 85025 COMPLETE CBC W/AUTO DIFF WBC: CPT | Performed by: PHYSICIAN ASSISTANT

## 2020-02-19 PROCEDURE — 93005 ELECTROCARDIOGRAM TRACING: CPT | Performed by: PHYSICIAN ASSISTANT

## 2020-02-19 PROCEDURE — 84439 ASSAY OF FREE THYROXINE: CPT | Performed by: PHYSICIAN ASSISTANT

## 2020-02-19 PROCEDURE — 81003 URINALYSIS AUTO W/O SCOPE: CPT | Performed by: PHYSICIAN ASSISTANT

## 2020-02-19 PROCEDURE — 84484 ASSAY OF TROPONIN QUANT: CPT | Performed by: PHYSICIAN ASSISTANT

## 2020-02-19 RX ORDER — SODIUM CHLORIDE 0.9 % (FLUSH) 0.9 %
10 SYRINGE (ML) INJECTION AS NEEDED
Status: DISCONTINUED | OUTPATIENT
Start: 2020-02-19 | End: 2020-02-19 | Stop reason: HOSPADM

## 2020-02-19 RX ORDER — HYDRALAZINE HYDROCHLORIDE 20 MG/ML
10 INJECTION INTRAMUSCULAR; INTRAVENOUS ONCE
Status: COMPLETED | OUTPATIENT
Start: 2020-02-19 | End: 2020-02-19

## 2020-02-19 RX ADMIN — HYDRALAZINE HYDROCHLORIDE 10 MG: 20 INJECTION INTRAMUSCULAR; INTRAVENOUS at 01:03

## 2020-02-19 NOTE — ED NOTES
Patient was recently started on 10 MG Lisinopril recently. Pt states he has taken 15 mg today and one clonidine this evening. Pt reports a recent stroke on the 10th. Pt noted to be speaking with no difficulties and ambulating with no issues. Pt reports no symptoms at this time. Patient noted to be normal sinus on the monitor at this time.      Patrick Pavon RN  02/19/20 0030

## 2020-02-19 NOTE — ED NOTES
EKG completed by me @5367, and was given to Dr. Herrera.      Symes, [facility]  02/19/20 0112

## 2020-02-19 NOTE — ED NOTES
Patient updated that plan of care entails a CT scan of his head. Pt states that he does not want another scan tonight. He denies any headahce or visual disturbances. Provider made aware that patient is refusing scan.      Patrick Pavon RN  02/19/20 0107

## 2020-02-19 NOTE — ED PROVIDER NOTES
Subjective   Patient is a 80-year-old male with atrial fibrillation, hypertension and recent brain bleed.  He states that they have recently discontinued his Lopressor and started him on lisinopril.  He states he initially started with 5 mg a day and a few days ago they increased it to 10 mg a day.  He states that he is continuing to have elevated blood pressures.  He states max reading at home was around 200/100.  He states that today he took his morning dose of lisinopril and then around 6 PM this evening took a's half a dose because of elevated blood pressure.  His blood pressure was still elevated and he took his wife's clonidine 0.1 mg at 10 PM.  He denies chest pain, shortness of breath, headache, change in vision, lightheadedness, weakness, abdominal pain calmness or other associated symptoms.      History provided by:  Patient   used: No    Hypertension   Severity:  Moderate  Onset quality:  Sudden  Duration:  1 day  Timing:  Constant  Progression:  Worsening  Chronicity:  New  Time since last dose of antihypertensive:  3 hours  Notable PTA blood pressures:  200/100  Context: medication change    Context: normal sodium, not caffeine, not drug abuse, not noncompliance, not OTC medications used and not stress    Relieved by:  Nothing  Worsened by:  Nothing  Ineffective treatments:  ACE inhibitors  Associated symptoms: no abdominal pain, no anxiety, no blurred vision, no chest pain, no confusion, no dizziness, no ear pain, no epistaxis, no fatigue, no fever, no headaches, no hematuria, no hypokalemia, no loss of consciousness, no nausea, no palpitations, no shortness of breath, no syncope, no tinnitus, not vomiting and no weakness    Risk factors: prior stroke        Review of Systems   Constitutional: Negative.  Negative for fatigue and fever.   HENT: Negative.  Negative for congestion, ear discharge, ear pain, facial swelling, hearing loss, mouth sores, nosebleeds, postnasal drip,  rhinorrhea, sinus pressure, sinus pain, sneezing, sore throat, tinnitus and trouble swallowing.    Eyes: Negative.  Negative for blurred vision, pain, discharge, redness and itching.   Respiratory: Negative.  Negative for shortness of breath.    Cardiovascular: Negative.  Negative for chest pain, palpitations and syncope.   Gastrointestinal: Negative.  Negative for abdominal pain, nausea and vomiting.   Endocrine: Negative.    Genitourinary: Negative.  Negative for dysuria, frequency, hematuria and urgency.   Musculoskeletal: Negative.    Skin: Negative.    Neurological: Negative.  Negative for dizziness, loss of consciousness, syncope, weakness, numbness and headaches.   Psychiatric/Behavioral: Negative.  Negative for confusion. The patient is not nervous/anxious.    All other systems reviewed and are negative.      Past Medical History:   Diagnosis Date   • A-fib (CMS/[facility])    • Hypertension        No Known Allergies    No past surgical history on file.    No family history on file.    Social History     Socioeconomic History   • Marital status:      Spouse name: Not on file   • Number of children: Not on file   • Years of education: Not on file   • Highest education level: Not on file   Tobacco Use   • Smoking status: Never Smoker   • Smokeless tobacco: Never Used   Substance and Sexual Activity   • Alcohol use: No     Frequency: Never   • Drug use: No   • Sexual activity: Defer           Objective   Physical Exam   Constitutional: He is oriented to person, place, and time. He appears well-developed and well-nourished. No distress.   HENT:   Head: Normocephalic and atraumatic.   Right Ear: External ear normal.   Left Ear: External ear normal.   Nose: Nose normal.   Eyes: Pupils are equal, round, and reactive to light. Conjunctivae and EOM are normal.   Neck: Normal range of motion. Neck supple. No JVD present. No tracheal deviation present.   Cardiovascular: Normal rate, regular rhythm and normal heart  sounds.   No murmur heard.  Pulmonary/Chest: Effort normal and breath sounds normal. No respiratory distress. He has no wheezes.   Abdominal: Soft. Bowel sounds are normal. There is no tenderness.   Musculoskeletal: Normal range of motion. He exhibits no edema or deformity.   Neurological: He is alert and oriented to person, place, and time. No cranial nerve deficit.   Skin: Skin is warm and dry. No rash noted. He is not diaphoretic. No erythema. No pallor.   Psychiatric: He has a normal mood and affect. His behavior is normal. Thought content normal.   Nursing note and vitals reviewed.      Procedures           ED Course  ED Course as of Feb 19 0348   Wed Feb 19, 2020   0109 Patient refused CT scan of head.     []   0347 Discussed plan of care with patient.  Blood pressure improved significantly.  Advised if symptoms return to return back to the ED.  Advised to follow-up with his PCP today for further management of his hypertension.    []      ED Course User Index  [] Kell Gomez PA-C                                           MDM  Number of Diagnoses or Management Options  Essential hypertension: new and requires workup     Amount and/or Complexity of Data Reviewed  Clinical lab tests: ordered and reviewed  Decide to obtain previous medical records or to obtain history from someone other than the patient: yes  Discuss the patient with other providers: yes    Risk of Complications, Morbidity, and/or Mortality  Presenting problems: moderate  Diagnostic procedures: moderate  Management options: moderate    Patient Progress  Patient progress: stable      Final diagnoses:   Essential hypertension            Kell Gomez PA-C  02/19/20 0348

## 2020-06-05 ENCOUNTER — CONSULT (OUTPATIENT)
Dept: CARDIOLOGY | Facility: CLINIC | Age: 80
End: 2020-06-05

## 2020-06-05 VITALS
SYSTOLIC BLOOD PRESSURE: 138 MMHG | BODY MASS INDEX: 28 KG/M2 | HEIGHT: 71 IN | DIASTOLIC BLOOD PRESSURE: 82 MMHG | HEART RATE: 82 BPM | WEIGHT: 200 LBS

## 2020-06-05 DIAGNOSIS — I61.9 NONTRAUMATIC INTRACEREBRAL HEMORRHAGE, UNSPECIFIED CEREBRAL LOCATION, UNSPECIFIED LATERALITY (HCC): ICD-10-CM

## 2020-06-05 DIAGNOSIS — I48.0 PAROXYSMAL ATRIAL FIBRILLATION (HCC): Primary | ICD-10-CM

## 2020-06-05 DIAGNOSIS — R06.00 DYSPNEA, UNSPECIFIED TYPE: ICD-10-CM

## 2020-06-05 PROCEDURE — 99204 OFFICE O/P NEW MOD 45 MIN: CPT | Performed by: INTERNAL MEDICINE

## 2020-06-05 RX ORDER — OMEPRAZOLE 40 MG/1
40 CAPSULE, DELAYED RELEASE ORAL DAILY
Qty: 90 CAPSULE | Refills: 3 | Status: SHIPPED | OUTPATIENT
Start: 2020-06-05

## 2020-06-05 RX ORDER — AMLODIPINE BESYLATE 5 MG/1
5 TABLET ORAL DAILY
COMMUNITY

## 2020-06-05 NOTE — PROGRESS NOTES
Bill Sanford  1940  PCP: Timo Pérez MD    SUBJECTIVE:   Bill Sanford is a 80 y.o. male seen for a consultation visit regarding the following:     Chief Complaint:   Chief Complaint   Patient presents with   • Atrial Fibrillation          Consultation is requested by Timo Pérez MD for evaluation of Atrial Fibrillation        History:  This is a 80-year-old patient referred by Dr. Pérez for further evaluation management of atrial fibrillation.  The patient in February 2020 presented to [facility] with a new speech impediment.  He underwent further evaluation as found to have intracranial hemorrhage.  Patient has been on Xarelto for his atrial fibrillation.  Patient has a history of atrial fibrillation and did undergo a cardioversion in the past.  He is unclear if he has any significant atrial fibrillation burden at this time.  He does complain of ongoing symptoms of GERD and acid reflux.  He has had a cardiac work-up in the past with both his echo and nuclear stress test that was unremarkable.  He has symptoms of acid reflux on a daily basis.      Cardiac PMH: (Old records have been reviewed and summarized below)  1. Atrial Fibrillation - dx 2019 - Had a cardioversion  2. Had ICH in Feb 2020  3. Stress Test - Feb 2019 - No ischemia seen  4. ECHO - Feb 2019 - Normal EF, Dilated LA    Past Medical History, Past Surgical History, Family history, Social History, and Medications were all reviewed with the patient today and updated as necessary.       Current Outpatient Medications:   •  amLODIPine (NORVASC) 5 MG tablet, Take 5 mg by mouth Daily., Disp: , Rfl:   •  lisinopril (PRINIVIL,ZESTRIL) 5 MG tablet, Take 10 mg by mouth Daily. 10 AM  5 PM, Disp: , Rfl:   •  multivitamin (DAILY ONEYDA) tablet tablet, Take 1 tablet by mouth Daily., Disp: , Rfl:   •  omeprazole (priLOSEC) 40 MG capsule, Take 1 capsule by mouth Daily., Disp: 90 capsule, Rfl: 3  •  rivaroxaban (XARELTO) 20 MG tablet, Take 20 mg by mouth  "Daily., Disp: , Rfl:     No Known Allergies      Past Medical History:   Diagnosis Date   • A-fib (CMS/HCC)    • GERD (gastroesophageal reflux disease)    • Hypertension      History reviewed. No pertinent surgical history.  Family History   Problem Relation Age of Onset   • Hypertension Other    • Stroke Other    • Heart disease Mother    • Liver cancer Father      Social History     Tobacco Use   • Smoking status: Former Smoker     Types: Cigarettes     Last attempt to quit: 1990     Years since quittin.0   • Smokeless tobacco: Never Used   Substance Use Topics   • Alcohol use: No     Frequency: Never       ROS:    General: no recent weight loss/gain, weakness or fatigue  Skin: no rashes, lumps, or other skin changes  HEENT: no dizziness, lightheadedness, or vision changes  Respiratory: no cough or hemoptysis  Cardiovascular: + Chest Pain   Gastrointestinal: no black/tarry stools or diarrhea  Urinary: no change in frequency or urgency  Peripheral Vascular: no claudication or leg cramps  Musculoskeletal: no muscle or joint pain/stiffness  Psychiatric: no depression or excessive stress  Neurological: no sensory or motor loss, no syncope  Hematologic: no anemia, easy bruising or bleeding  Endocrine: no thyroid problems, nor heat or cold intolerance       PHYSICAL EXAM:   /82 (BP Location: Left arm, Patient Position: Sitting)   Pulse 82   Ht 180.3 cm (71\")   Wt 90.7 kg (200 lb)   BMI 27.89 kg/m²      Wt Readings from Last 5 Encounters:   20 90.7 kg (200 lb)   20 92.5 kg (204 lb)   02/10/20 93 kg (205 lb)   08/15/19 91.2 kg (201 lb)   19 93.6 kg (206 lb 6.4 oz)     BP Readings from Last 5 Encounters:   20 138/82   20 152/80   02/10/20 164/79   08/15/19 154/89   19 119/76       General-Well Nourished, Well developed  Eyes - PERRLA  Neck- supple, No mass  CV- regular rate and rhythm, no MRG  Lung- clear bilaterally  Abd- soft, +BS  Musc/skel - Norm strength and range " of motion  Skin- warm and dry  Neuro - Alert & Oriented x 3, appropriate mood.    Medical problems and test results were reviewed with the patient today.     Results for orders placed or performed during the hospital encounter of 02/19/20   Comprehensive Metabolic Panel   Result Value Ref Range    Glucose 132 (H) 65 - 99 mg/dL    BUN 18 8 - 23 mg/dL    Creatinine 1.25 0.76 - 1.27 mg/dL    Sodium 137 136 - 145 mmol/L    Potassium 4.4 3.5 - 5.2 mmol/L    Chloride 102 98 - 107 mmol/L    CO2 23.2 22.0 - 29.0 mmol/L    Calcium 9.3 8.6 - 10.5 mg/dL    Total Protein 6.7 6.0 - 8.5 g/dL    Albumin 4.12 3.50 - 5.20 g/dL    ALT (SGPT) 20 1 - 41 U/L    AST (SGOT) 23 1 - 40 U/L    Alkaline Phosphatase 100 39 - 117 U/L    Total Bilirubin 0.8 0.2 - 1.2 mg/dL    eGFR Non African Amer 56 (L) >60 mL/min/1.73    Globulin 2.6 gm/dL    A/G Ratio 1.6 g/dL    BUN/Creatinine Ratio 14.4 7.0 - 25.0    Anion Gap 11.8 5.0 - 15.0 mmol/L   Troponin   Result Value Ref Range    Troponin T <0.010 0.000 - 0.030 ng/mL   Troponin   Result Value Ref Range    Troponin T <0.010 0.000 - 0.030 ng/mL   T4, Free   Result Value Ref Range    Free T4 1.50 0.93 - 1.70 ng/dL   TSH   Result Value Ref Range    TSH 5.450 (H) 0.270 - 4.200 uIU/mL   Urinalysis With Microscopic If Indicated (No Culture) - Urine, Clean Catch   Result Value Ref Range    Color, UA Yellow Yellow, Straw    Appearance, UA Clear Clear    pH, UA 6.5 5.0 - 8.0    Specific Gravity, UA 1.012 1.005 - 1.030    Glucose, UA Negative Negative    Ketones, UA Negative Negative    Bilirubin, UA Negative Negative    Blood, UA Negative Negative    Protein, UA Negative Negative    Leuk Esterase, UA Negative Negative    Nitrite, UA Negative Negative    Urobilinogen, UA 1.0 E.U./dL 0.2 - 1.0 E.U./dL   CBC Auto Differential   Result Value Ref Range    WBC 7.83 3.40 - 10.80 10*3/mm3    RBC 4.62 4.14 - 5.80 10*6/mm3    Hemoglobin 14.7 13.0 - 17.7 g/dL    Hematocrit 44.0 37.5 - 51.0 %    MCV 95.2 79.0 - 97.0 fL     MCH 31.8 26.6 - 33.0 pg    MCHC 33.4 31.5 - 35.7 g/dL    RDW 12.5 12.3 - 15.4 %    RDW-SD 43.7 37.0 - 54.0 fl    MPV 10.1 6.0 - 12.0 fL    Platelets 191 140 - 450 10*3/mm3    Neutrophil % 71.1 42.7 - 76.0 %    Lymphocyte % 14.9 (L) 19.6 - 45.3 %    Monocyte % 11.4 5.0 - 12.0 %    Eosinophil % 1.7 0.3 - 6.2 %    Basophil % 0.5 0.0 - 1.5 %    Immature Grans % 0.4 0.0 - 0.5 %    Neutrophils, Absolute 5.57 1.70 - 7.00 10*3/mm3    Lymphocytes, Absolute 1.17 0.70 - 3.10 10*3/mm3    Monocytes, Absolute 0.89 0.10 - 0.90 10*3/mm3    Eosinophils, Absolute 0.13 0.00 - 0.40 10*3/mm3    Basophils, Absolute 0.04 0.00 - 0.20 10*3/mm3    Immature Grans, Absolute 0.03 0.00 - 0.05 10*3/mm3    nRBC 0.0 0.0 - 0.2 /100 WBC         Lab Results   Component Value Date    CHOL 139 02/03/2019    HDL 31 (L) 02/03/2019    LDL 91 02/03/2019    VLDL 17 02/03/2019       EKG:  (EKG/Tracing has been independently visualized by me and summarized below)      ECG 12 Lead  Date/Time: 6/8/2020 9:13 PM  Performed by: Titi Nicholson MD  Authorized by: Titi Nicholson MD   Comparison: compared with previous ECG   Similar to previous ECG  Rhythm: sinus rhythm  Rate: normal  BPM: 82  ST Segments: ST segments normal  T Waves: T waves normal    Clinical impression: normal ECG            ASSESSMENT and PLAN  1.  Atrial fibrillation-overall burden and rates are unknown.  We will check a 2-week monitor to better define.  2.  Intracranial hemorrhage while taking Xarelto.  He is currently back on Xarelto at this time.  Long-term we could consider Eliquis or the possibility of a left atrial appendage closure device.  We will address further with him in the future.  3.  Chest pain/FARMER-he reports he has a hiatal hernia as etiology.  He has had a cardiac work-up in the past that is been negative.  We will continue with his acid reflux medications.      Return in about 6 weeks (around 7/17/2020).    1. 2 week Monitor        Titi Nicholson M.D., F.A.C.C,  [facility]  Cardiology/Electrophysiology  06/08/20  21:13

## 2020-06-08 PROBLEM — I61.9 NONTRAUMATIC INTRACEREBRAL HEMORRHAGE (HCC): Status: ACTIVE | Noted: 2020-06-08

## 2020-06-08 PROBLEM — I48.0 PAROXYSMAL ATRIAL FIBRILLATION: Status: ACTIVE | Noted: 2019-02-03

## 2020-06-08 PROCEDURE — 93000 ELECTROCARDIOGRAM COMPLETE: CPT | Performed by: INTERNAL MEDICINE

## 2023-05-19 ENCOUNTER — HOSPITAL ENCOUNTER (EMERGENCY)
Facility: HOSPITAL | Age: 83
Discharge: HOME | End: 2023-05-19
Attending: EMERGENCY MEDICINE
Payer: MEDICARE

## 2023-05-19 ENCOUNTER — APPOINTMENT (OUTPATIENT)
Dept: CT IMAGING | Facility: HOSPITAL | Age: 83
End: 2023-05-19
Payer: MEDICARE

## 2023-05-19 ENCOUNTER — APPOINTMENT (OUTPATIENT)
Dept: GENERAL RADIOLOGY | Facility: HOSPITAL | Age: 83
End: 2023-05-19
Payer: MEDICARE

## 2023-05-19 VITALS
BODY MASS INDEX: 27.72 KG/M2 | HEART RATE: 71 BPM | OXYGEN SATURATION: 100 % | SYSTOLIC BLOOD PRESSURE: 138 MMHG | DIASTOLIC BLOOD PRESSURE: 82 MMHG | RESPIRATION RATE: 17 BRPM | HEIGHT: 71 IN | WEIGHT: 198 LBS | TEMPERATURE: 97.3 F

## 2023-05-19 DIAGNOSIS — R13.10 DIFFICULTY SWALLOWING SOLIDS: Primary | ICD-10-CM

## 2023-05-19 DIAGNOSIS — N17.9 AKI (ACUTE KIDNEY INJURY): ICD-10-CM

## 2023-05-19 DIAGNOSIS — K22.4 ESOPHAGEAL DYSMOTILITY: ICD-10-CM

## 2023-05-19 LAB
ALBUMIN SERPL-MCNC: 4.3 G/DL (ref 3.5–5.2)
ALBUMIN/GLOB SERPL: 1.6 G/DL
ALP SERPL-CCNC: 98 U/L (ref 39–117)
ALT SERPL W P-5'-P-CCNC: 20 U/L (ref 1–41)
ANION GAP SERPL CALCULATED.3IONS-SCNC: 11.6 MMOL/L (ref 5–15)
AST SERPL-CCNC: 32 U/L (ref 1–40)
BASOPHILS # BLD AUTO: 0.05 10*3/MM3 (ref 0–0.2)
BASOPHILS NFR BLD AUTO: 0.4 % (ref 0–1.5)
BILIRUB SERPL-MCNC: 1.4 MG/DL (ref 0–1.2)
BUN SERPL-MCNC: 31 MG/DL (ref 8–23)
BUN/CREAT SERPL: 21.7 (ref 7–25)
CALCIUM SPEC-SCNC: 9.9 MG/DL (ref 8.6–10.5)
CHLORIDE SERPL-SCNC: 101 MMOL/L (ref 98–107)
CO2 SERPL-SCNC: 25.4 MMOL/L (ref 22–29)
CREAT SERPL-MCNC: 1.43 MG/DL (ref 0.76–1.27)
CRP SERPL-MCNC: <0.3 MG/DL (ref 0–0.5)
DEPRECATED RDW RBC AUTO: 45.9 FL (ref 37–54)
EGFRCR SERPLBLD CKD-EPI 2021: 49.2 ML/MIN/1.73
EOSINOPHIL # BLD AUTO: 0.03 10*3/MM3 (ref 0–0.4)
EOSINOPHIL NFR BLD AUTO: 0.2 % (ref 0.3–6.2)
ERYTHROCYTE [DISTWIDTH] IN BLOOD BY AUTOMATED COUNT: 13.4 % (ref 12.3–15.4)
FLUAV RNA RESP QL NAA+PROBE: NOT DETECTED
FLUBV RNA ISLT QL NAA+PROBE: NOT DETECTED
GLOBULIN UR ELPH-MCNC: 2.7 GM/DL
GLUCOSE SERPL-MCNC: 85 MG/DL (ref 65–99)
HCT VFR BLD AUTO: 47.7 % (ref 37.5–51)
HGB BLD-MCNC: 15.8 G/DL (ref 13–17.7)
IMM GRANULOCYTES # BLD AUTO: 0.04 10*3/MM3 (ref 0–0.05)
IMM GRANULOCYTES NFR BLD AUTO: 0.3 % (ref 0–0.5)
LYMPHOCYTES # BLD AUTO: 2.7 10*3/MM3 (ref 0.7–3.1)
LYMPHOCYTES NFR BLD AUTO: 21.3 % (ref 19.6–45.3)
MCH RBC QN AUTO: 30.7 PG (ref 26.6–33)
MCHC RBC AUTO-ENTMCNC: 33.1 G/DL (ref 31.5–35.7)
MCV RBC AUTO: 92.8 FL (ref 79–97)
MONOCYTES # BLD AUTO: 1.55 10*3/MM3 (ref 0.1–0.9)
MONOCYTES NFR BLD AUTO: 12.2 % (ref 5–12)
NEUTROPHILS NFR BLD AUTO: 65.6 % (ref 42.7–76)
NEUTROPHILS NFR BLD AUTO: 8.29 10*3/MM3 (ref 1.7–7)
NRBC BLD AUTO-RTO: 0 /100 WBC (ref 0–0.2)
PLATELET # BLD AUTO: 221 10*3/MM3 (ref 140–450)
PMV BLD AUTO: 10.4 FL (ref 6–12)
POTASSIUM SERPL-SCNC: 4.2 MMOL/L (ref 3.5–5.2)
PROT SERPL-MCNC: 7 G/DL (ref 6–8.5)
RBC # BLD AUTO: 5.14 10*6/MM3 (ref 4.14–5.8)
SARS-COV-2 RNA RESP QL NAA+PROBE: NOT DETECTED
SODIUM SERPL-SCNC: 138 MMOL/L (ref 136–145)
WBC NRBC COR # BLD: 12.66 10*3/MM3 (ref 3.4–10.8)

## 2023-05-19 PROCEDURE — 70450 CT HEAD/BRAIN W/O DYE: CPT | Performed by: RADIOLOGY

## 2023-05-19 PROCEDURE — 70490 CT SOFT TISSUE NECK W/O DYE: CPT | Performed by: RADIOLOGY

## 2023-05-19 PROCEDURE — 80053 COMPREHEN METABOLIC PANEL: CPT | Performed by: PHYSICIAN ASSISTANT

## 2023-05-19 PROCEDURE — 87636 SARSCOV2 & INF A&B AMP PRB: CPT | Performed by: PHYSICIAN ASSISTANT

## 2023-05-19 PROCEDURE — 86140 C-REACTIVE PROTEIN: CPT | Performed by: PHYSICIAN ASSISTANT

## 2023-05-19 PROCEDURE — 74220 X-RAY XM ESOPHAGUS 1CNTRST: CPT

## 2023-05-19 PROCEDURE — 74220 X-RAY XM ESOPHAGUS 1CNTRST: CPT | Performed by: RADIOLOGY

## 2023-05-19 PROCEDURE — 85025 COMPLETE CBC W/AUTO DIFF WBC: CPT | Performed by: PHYSICIAN ASSISTANT

## 2023-05-19 PROCEDURE — 99284 EMERGENCY DEPT VISIT MOD MDM: CPT

## 2023-05-19 PROCEDURE — 70490 CT SOFT TISSUE NECK W/O DYE: CPT

## 2023-05-19 PROCEDURE — 70450 CT HEAD/BRAIN W/O DYE: CPT

## 2023-05-19 RX ADMIN — SODIUM CHLORIDE 500 ML: 9 INJECTION, SOLUTION INTRAVENOUS at 13:04

## 2023-05-19 NOTE — ED PROVIDER NOTES
Subjective   History of Present Illness  This is an 83 year old male patient who presents to the ER with chief complaint of difficulty swallowing. PMH significant for HTN, GERD, A fibb with watchmen (not on blood thinners). 3 days ago, the patient noticed difficulty swallowing when he was eating and became choked. Since that time, he hasn't been able to appropriately eat or drink because he is unable to swallow. Denies chest pain and SOB.         Review of Systems   Constitutional: Negative.  Negative for fever.   HENT: Positive for trouble swallowing. Negative for congestion, dental problem, drooling, ear discharge, ear pain, facial swelling, hearing loss, mouth sores, nosebleeds, postnasal drip, rhinorrhea, sinus pressure, sinus pain, sneezing, sore throat, tinnitus and voice change.    Respiratory: Negative.    Cardiovascular: Negative.  Negative for chest pain.   Gastrointestinal: Negative.  Negative for abdominal pain.   Endocrine: Negative.    Genitourinary: Negative.  Negative for dysuria.   Skin: Negative.    Neurological: Negative.    Psychiatric/Behavioral: Negative.    All other systems reviewed and are negative.      Past Medical History:   Diagnosis Date   • A-fib    • GERD (gastroesophageal reflux disease)    • Hypertension        No Known Allergies    No past surgical history on file.    Family History   Problem Relation Age of Onset   • Hypertension Other    • Stroke Other    • Heart disease Mother    • Liver cancer Father        Social History     Socioeconomic History   • Marital status:    Tobacco Use   • Smoking status: Former     Types: Cigarettes     Quit date: 1990     Years since quittin.9   • Smokeless tobacco: Never   Substance and Sexual Activity   • Alcohol use: No   • Drug use: No   • Sexual activity: Defer           Objective   Physical Exam  Vitals and nursing note reviewed.   Constitutional:       General: He is not in acute distress.     Appearance: He is  well-developed. He is not diaphoretic.   HENT:      Head: Normocephalic and atraumatic.      Right Ear: External ear normal.      Left Ear: External ear normal.      Nose: Nose normal.   Eyes:      Conjunctiva/sclera: Conjunctivae normal.      Pupils: Pupils are equal, round, and reactive to light.   Neck:      Vascular: No JVD.      Trachea: No tracheal deviation.   Cardiovascular:      Rate and Rhythm: Normal rate and regular rhythm.      Heart sounds: Normal heart sounds. No murmur heard.  Pulmonary:      Effort: Pulmonary effort is normal. No respiratory distress.      Breath sounds: Normal breath sounds. No wheezing.   Abdominal:      General: Bowel sounds are normal.      Palpations: Abdomen is soft.      Tenderness: There is no abdominal tenderness. There is no right CVA tenderness, left CVA tenderness, guarding or rebound.   Musculoskeletal:         General: No deformity. Normal range of motion.      Cervical back: Normal range of motion and neck supple.   Skin:     General: Skin is warm and dry.      Coloration: Skin is not pale.      Findings: No erythema or rash.   Neurological:      Mental Status: He is alert and oriented to person, place, and time.      Cranial Nerves: No cranial nerve deficit.   Psychiatric:         Behavior: Behavior normal.         Thought Content: Thought content normal.         Procedures        Results for orders placed or performed during the hospital encounter of 05/19/23   COVID-19 and FLU A/B PCR - Swab, Nasopharynx    Specimen: Nasopharynx; Swab   Result Value Ref Range    COVID19 Not Detected Not Detected - Ref. Range    Influenza A PCR Not Detected Not Detected    Influenza B PCR Not Detected Not Detected   Comprehensive Metabolic Panel    Specimen: Cannula; Blood   Result Value Ref Range    Glucose 85 65 - 99 mg/dL    BUN 31 (H) 8 - 23 mg/dL    Creatinine 1.43 (H) 0.76 - 1.27 mg/dL    Sodium 138 136 - 145 mmol/L    Potassium 4.2 3.5 - 5.2 mmol/L    Chloride 101 98 - 107  mmol/L    CO2 25.4 22.0 - 29.0 mmol/L    Calcium 9.9 8.6 - 10.5 mg/dL    Total Protein 7.0 6.0 - 8.5 g/dL    Albumin 4.3 3.5 - 5.2 g/dL    ALT (SGPT) 20 1 - 41 U/L    AST (SGOT) 32 1 - 40 U/L    Alkaline Phosphatase 98 39 - 117 U/L    Total Bilirubin 1.4 (H) 0.0 - 1.2 mg/dL    Globulin 2.7 gm/dL    A/G Ratio 1.6 g/dL    BUN/Creatinine Ratio 21.7 7.0 - 25.0    Anion Gap 11.6 5.0 - 15.0 mmol/L    eGFR 49.2 (L) >60.0 mL/min/1.73   C-reactive Protein    Specimen: Cannula; Blood   Result Value Ref Range    C-Reactive Protein <0.30 0.00 - 0.50 mg/dL   CBC Auto Differential    Specimen: Cannula; Blood   Result Value Ref Range    WBC 12.66 (H) 3.40 - 10.80 10*3/mm3    RBC 5.14 4.14 - 5.80 10*6/mm3    Hemoglobin 15.8 13.0 - 17.7 g/dL    Hematocrit 47.7 37.5 - 51.0 %    MCV 92.8 79.0 - 97.0 fL    MCH 30.7 26.6 - 33.0 pg    MCHC 33.1 31.5 - 35.7 g/dL    RDW 13.4 12.3 - 15.4 %    RDW-SD 45.9 37.0 - 54.0 fl    MPV 10.4 6.0 - 12.0 fL    Platelets 221 140 - 450 10*3/mm3    Neutrophil % 65.6 42.7 - 76.0 %    Lymphocyte % 21.3 19.6 - 45.3 %    Monocyte % 12.2 (H) 5.0 - 12.0 %    Eosinophil % 0.2 (L) 0.3 - 6.2 %    Basophil % 0.4 0.0 - 1.5 %    Immature Grans % 0.3 0.0 - 0.5 %    Neutrophils, Absolute 8.29 (H) 1.70 - 7.00 10*3/mm3    Lymphocytes, Absolute 2.70 0.70 - 3.10 10*3/mm3    Monocytes, Absolute 1.55 (H) 0.10 - 0.90 10*3/mm3    Eosinophils, Absolute 0.03 0.00 - 0.40 10*3/mm3    Basophils, Absolute 0.05 0.00 - 0.20 10*3/mm3    Immature Grans, Absolute 0.04 0.00 - 0.05 10*3/mm3    nRBC 0.0 0.0 - 0.2 /100 WBC         ED Course  ED Course as of 05/19/23 1624   Fri May 19, 2023   1245 CT Soft Tissue Neck Without Contrast  IMPRESSION:  1.  No soft tissue mass.  2.  No soft tissue edema.  3.  No cervical adenopathy.     This report was finalized on 5/19/2023 12:43 PM by Dr. Lauri Burt MD [MM]   1245 CT Head Without Contrast  IMPRESSION:  1.  Senescent changes.  2.  Moderate-advanced chronic small vessel ischemic disease.  3.   No CT evidence of acute intracranial abnormality.     This report was finalized on 5/19/2023 12:39 PM by Dr. Lauri Burt MD [MM]   1300 Spoke with Dr. Dominguez who suggested the patient have an esophagram to further evaluate his dysphagia.  [MM]   1609 FL Esophagram Complete Single Contrast  IMPRESSION:  1.  Deep laryngeal penetration with thin barium. No aspiration.  2.  No esophageal stricture or luminal filling defect.  3.  Diffuse esophageal tertiary contractions with marked esophageal  dysmotility.  4.  Retrograde esophageal flow and gastroesophageal reflux.     This report was finalized on 5/19/2023 4:06 PM by Dr. Lauri Burt MD [MM]   1622 Patient diagnosed with esophageal dysmotility. Will be d/c home with instructions to continue to protonix and f/u with GI. Will return to ER if symptoms worsen.  [MM]      ED Course User Index  [MM] Lissette Pearson PA                                           Medical Decision Making    This is an 83 year old male patient who presents to the ER with chief complaint of difficulty swallowing. PMH significant for HTN, GERD, A fibb with watchmen (not on blood thinners). 3 days ago, the patient noticed difficulty swallowing when he was eating and became choked. Since that time, he hasn't been able to appropriately eat or drink because he is unable to swallow. Denies chest pain and SOB.         LELIA (acute kidney injury): acute illness or injury  Difficulty swallowing solids: acute illness or injury  Esophageal dysmotility: acute illness or injury  Amount and/or Complexity of Data Reviewed  Labs: ordered. Decision-making details documented in ED Course.  Radiology: ordered. Decision-making details documented in ED Course.          Final diagnoses:   Difficulty swallowing solids   LELIA (acute kidney injury)   Esophageal dysmotility       ED Disposition  ED Disposition     ED Disposition   Discharge    Condition   Stable    Comment   --             Timo Pérez MD  [address]  [address]  [place] [address]  [phone]    In 2 days      Otto Paige MD  [address] [place] [address]  [phone]    In 2 days           Medication List      No changes were made to your prescriptions during this visit.          Lissette Pearson PA  05/19/23 [id number]

## 2023-05-19 NOTE — DISCHARGE INSTRUCTIONS
Please take your protonix and drink lots of fluids. Please follow up with your PCP and GI or return to ER if symptoms worsen.

## 2023-05-31 ENCOUNTER — TRANSCRIBE ORDERS (OUTPATIENT)
Dept: ADMINISTRATIVE | Facility: HOSPITAL | Age: 83
End: 2023-05-31

## 2023-05-31 DIAGNOSIS — Z86.73 PERSONAL HISTORY OF TIA (TRANSIENT ISCHEMIC ATTACK): ICD-10-CM

## 2023-05-31 DIAGNOSIS — R13.14 DYSPHAGIA, PHARYNGOESOPHAGEAL PHASE: Primary | ICD-10-CM

## 2023-06-02 ENCOUNTER — HOSPITAL ENCOUNTER (OUTPATIENT)
Dept: MRI IMAGING | Facility: HOSPITAL | Age: 83
Discharge: HOME | End: 2023-06-02

## 2023-06-02 DIAGNOSIS — R13.14 DYSPHAGIA, PHARYNGOESOPHAGEAL PHASE: ICD-10-CM

## 2023-06-02 DIAGNOSIS — Z86.73 PERSONAL HISTORY OF TIA (TRANSIENT ISCHEMIC ATTACK): ICD-10-CM

## 2023-06-02 PROCEDURE — 70551 MRI BRAIN STEM W/O DYE: CPT | Performed by: RADIOLOGY

## 2023-06-02 PROCEDURE — 70551 MRI BRAIN STEM W/O DYE: CPT

## 2023-06-09 ENCOUNTER — TRANSCRIBE ORDERS (OUTPATIENT)
Dept: ADMINISTRATIVE | Facility: HOSPITAL | Age: 83
End: 2023-06-09
Payer: MEDICARE

## 2023-06-09 DIAGNOSIS — R13.14 DYSPHAGIA, PHARYNGOESOPHAGEAL PHASE: Primary | ICD-10-CM

## 2023-06-13 ENCOUNTER — HOSPITAL ENCOUNTER (OUTPATIENT)
Dept: GENERAL RADIOLOGY | Facility: HOSPITAL | Age: 83
Discharge: HOME | End: 2023-06-13
Admitting: FAMILY MEDICINE
Payer: MEDICARE

## 2023-06-13 DIAGNOSIS — R13.14 DYSPHAGIA, PHARYNGOESOPHAGEAL PHASE: ICD-10-CM

## 2023-06-13 PROCEDURE — 74230 X-RAY XM SWLNG FUNCJ C+: CPT | Performed by: RADIOLOGY

## 2023-06-13 PROCEDURE — 92611 MOTION FLUOROSCOPY/SWALLOW: CPT

## 2023-06-13 PROCEDURE — 74230 X-RAY XM SWLNG FUNCJ C+: CPT

## 2023-06-13 NOTE — MBS/VFSS/FEES
"Outpatient  - Speech Language Pathology   Swallow Initial Evaluation  [facility]  OUTPATIENT MODIFIED BARIUM SWALLOW STUDY     Patient Name: Bill Sanford  : 1940  MRN: [id number]  Today's Date: 2023             Admit Date: 2023    Visit Dx:     ICD-10-CM ICD-9-CM   1. Dysphagia, pharyngoesophageal phase  R13.14 787.24     Patient Active Problem List   Diagnosis    Paroxysmal atrial fibrillation    Nontraumatic intracerebral hemorrhage     Past Medical History:   Diagnosis Date    A-fib     GERD (gastroesophageal reflux disease)     Hypertension      No past surgical history on file.      Bill Sanford  presents to the radiology suite this am from his private residence to participate in an instrumental MBS to evaluate safety/efficacy of swallowing fnx, determine safest/least restrictive diet. He is accompanied by his spouse whom remains in the lobby during the procedure.    Mr. Sanford reports dysphagia w/ foods like \"meats\", described as globus sensation and \"choking.\" He denies any recent h/o PNA or bronchitis. He is s/p esophagram complete 23 which revealed \"1. Deep laryngeal penetration with thin barium. No aspiration. 2.  No esophageal stricture or luminal filling defect. 3.  Diffuse esophageal tertiary contractions with marked esophageal dysmotility. 4.  Retrograde esophageal flow and gastroesophageal reflux.\"     No recent chest xray available for review.     Pt is observed on ra w/o complications.     Risks and benefits of the procedure are explained w/ pt verbalizing understanding/agreement to participate. Proceed per protocol.     Pt is positioned upright and centered in a stationary chair to accept multiple po presentations of solid cracker, puree, honey thick, nectar thick, and thin liquids via spoon, cup and straw, along w/ whole placebo pill in puree. Pt is able to self feed.     All views are from the lateral plane.     Facial/oral structures are symmetrical upon observation w/o lingual " deviation upon protrusion. Oral mucosa are moist, pink and clean. Secretions are clear, thin and well controlled. OROM/KAMINI is wfl to imitate oral postures. Gag is not assessed. Volitional cough is adequate in intensity, clear in quality, nonproductive. He is noted w/ baseline frequent dry cough before during and after this evaluation. He reports this is chronic in nature. Vocal quality is adequate in intensity, mildly rough in quality w/ intelligible speech. Pt is a/a and cooperative to particpate. He is oriented to person, place, and time, follows simple directives, and participates in simple conversational exchanges.     Upon po presentations, adequate bolus anticipation w/ good labial seal for bolus clearance via spoon bowl, cup rim stability and suction via straw. Bolus formation, manipulation, and control are wfl w/ rotary mastication pattern. A-p transit is timely w/o oral residue. Tongue base retraction and linguavelar seal are adequate w/o premature spillage. No laryngeal penetration or aspiration is evidenced before the swallow.     Pharyngeal swallow is timely w/ adequate hyolaryngeal elevation and epiglottic inversion. Pharyngeal contraction is generally weak w/ mild-moderate diffuse pharyngeal residue w/ puree and honey thick liquids, mild diffuse pharyngeal residue w/ solids, nectar thick and thin liquids. Residue is stable and controlled across this evaluation. No laryngeal penetration or aspiration evidenced during or after the swallow.     He is unable to transit whole placebo pill in puree, however, able to transit w/o difficulty whole w/ thin water.     Informal full esophageal sweep reveals no obvious mucosal abnormalities or retrograde flow during this procedure.      Impression: Per this evaluation, Mr. Sanford presents w/ wfl oropharyngeal swallow w/o laryngeal penetration or aspiration across this evaluation.     He is felt to most benefit from continuing least restrictive regular consistencies,  thin liquids as tolerated.     SLP Recommendation and Plan  1. Regular consistency diet, thin liquids.   2. Meds whole in puree/thins.   3. Kenny precautions.   4. Oral care protocol.   5. Alternate solids and liquids prn.     D/w pt and pt's spouse results and recommendations w/ verbal understanding and agreement.     Thank you for allowing me to participate in the care of your patient-  Lissette Vaughn M.A., CCC-SLP      EDUCATION  The patient has been educated in the following areas:   Dysphagia (Swallowing Impairment) Modified Diet Instruction.     Time Calculation:     Therapy Charges for Today       Code Description Service Date Service Provider Modifiers Qty    75180629922 HC ST MOTION FLUORO EVAL SWALLOW 6 6/13/2023 Lissette Vaughn MA,CCC-SLP GN 1            Lissette Vaughn MA,CCC-SLP  6/13/2023